# Patient Record
Sex: FEMALE | Race: BLACK OR AFRICAN AMERICAN | NOT HISPANIC OR LATINO | Employment: UNEMPLOYED | URBAN - METROPOLITAN AREA
[De-identification: names, ages, dates, MRNs, and addresses within clinical notes are randomized per-mention and may not be internally consistent; named-entity substitution may affect disease eponyms.]

---

## 2018-11-21 ENCOUNTER — TELEPHONE (OUTPATIENT)
Dept: HEMATOLOGY/ONCOLOGY | Facility: CLINIC | Age: 58
End: 2018-11-21

## 2018-11-21 ENCOUNTER — INITIAL CONSULT (OUTPATIENT)
Dept: GYNECOLOGIC ONCOLOGY | Facility: CLINIC | Age: 58
End: 2018-11-21
Payer: MEDICAID

## 2018-11-21 VITALS
SYSTOLIC BLOOD PRESSURE: 144 MMHG | HEIGHT: 65 IN | BODY MASS INDEX: 41.14 KG/M2 | DIASTOLIC BLOOD PRESSURE: 79 MMHG | HEART RATE: 78 BPM | WEIGHT: 246.94 LBS

## 2018-11-21 DIAGNOSIS — C54.1 ENDOMETRIAL CANCER: Primary | ICD-10-CM

## 2018-11-21 PROCEDURE — 99205 OFFICE O/P NEW HI 60 MIN: CPT | Mod: S$PBB,,, | Performed by: OBSTETRICS & GYNECOLOGY

## 2018-11-21 PROCEDURE — 99999 PR PBB SHADOW E&M-NEW PATIENT-LVL III: CPT | Mod: PBBFAC,,, | Performed by: OBSTETRICS & GYNECOLOGY

## 2018-11-21 PROCEDURE — 99203 OFFICE O/P NEW LOW 30 MIN: CPT | Mod: PBBFAC | Performed by: OBSTETRICS & GYNECOLOGY

## 2018-11-21 RX ORDER — METFORMIN HYDROCHLORIDE 1000 MG/1
1000 TABLET ORAL 2 TIMES DAILY WITH MEALS
COMMUNITY

## 2018-11-21 RX ORDER — HYDROCODONE BITARTRATE AND ACETAMINOPHEN 5; 325 MG/1; MG/1
1 TABLET ORAL EVERY 6 HOURS PRN
COMMUNITY
End: 2019-05-01

## 2018-11-21 RX ORDER — ROSUVASTATIN CALCIUM 20 MG/1
20 TABLET, COATED ORAL DAILY
COMMUNITY

## 2018-11-21 NOTE — LETTER
November 26, 2018      Gus Magaña Jr., MD  [address]  [facility] [address]           [place] [facility]  [address] [place] [id number]  Phone: [phone]  Fax: [phone]          Patient: Emerald Bee   MR Number: [id number]   YOB: 1960   Date of Visit: 11/21/2018       Dear Dr. Gus Magaña Jr.:    Thank you for referring Emerald Bee to me for evaluation. Attached you will find relevant portions of my assessment and plan of care.    If you have questions, please do not hesitate to call me. I look forward to following Emerald Bee along with you.    Sincerely,    Darwin Dumas MD    Enclosure  CC:  No Recipients    If you would like to receive this communication electronically, please contact [email or web address] or [phone] to request more information on [employer] Link access.    For providers and/or their staff who would like to refer a patient to [facility], please contact us through our one-stop-shop provider referral line, [facility], at [phone].    If you feel you have received this communication in error or would no longer like to receive these types of communications, please e-mail [email or web address]

## 2018-11-21 NOTE — TELEPHONE ENCOUNTER
----- Message from Foreign Burns MA sent at 11/21/2018 11:28 AM CST -----  Contact: Patient      ----- Message -----  From: Josiane Adorno  Sent: 11/21/2018  11:10 AM  To: [facility] Staff    Patient called and stated she received a message about coming in earlier today. She stated she can come for the 2 pm appointment. Please call her to confirm at [phone].    Thanks,  Josiane

## 2018-11-21 NOTE — PROGRESS NOTES
Subjective:      Patient ID: Emerald Bee is a 58 y.o. female.    Chief Complaint: Consult (endometrial)      HPI  Ms. Bee presents today for management of recently diagnosed endometrial cancer.  She reports a 6 month h/o PMB and underwent D&C.  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  CT scan revealed a 5 cm uterus and no obvious spread of disease.  She was noted to have a fat containing umbilical hernia.  TVUS on 9/23/18 showed a 10 x 5 cm uterus with a 4.5 cm right ovarian mass.  She reports minimal vaginal spotting since her D&C.  PSH significant for appy in 1979 via RLQ incision.  FH is negative for breast and gyn cancer.  She had a MU with colon cancer.  Review of Systems   Constitutional: Negative for activity change, appetite change, chills, fatigue and fever.   HENT: Negative for hearing loss, mouth sores, nosebleeds, sore throat and tinnitus.    Eyes: Negative for visual disturbance.   Respiratory: Negative for cough, chest tightness, shortness of breath and wheezing.    Cardiovascular: Negative for chest pain and leg swelling.   Gastrointestinal: Negative for abdominal distention, abdominal pain, blood in stool, constipation, diarrhea, nausea and vomiting.   Genitourinary: Negative for dysuria, flank pain, frequency, hematuria, pelvic pain, vaginal bleeding, vaginal discharge and vaginal pain.   Musculoskeletal: Negative for arthralgias and back pain.   Skin: Negative for rash.   Neurological: Negative for dizziness, seizures, syncope, weakness and numbness.   Hematological: Does not bruise/bleed easily.   Psychiatric/Behavioral: Negative for confusion and sleep disturbance. The patient is not nervous/anxious.        History reviewed. No pertinent past medical history.  History reviewed. No pertinent surgical history.  History reviewed. No pertinent family history.  Social History     Socioeconomic History    Marital status: Single     Spouse name: Not on file    Number of  children: Not on file    Years of education: Not on file    Highest education level: Not on file   Social Needs    Financial resource strain: Not on file    Food insecurity - worry: Not on file    Food insecurity - inability: Not on file    Transportation needs - medical: Not on file    Transportation needs - non-medical: Not on file   Occupational History    Not on file   Tobacco Use    Smoking status: Never Smoker    Smokeless tobacco: Never Used   Substance and Sexual Activity    Alcohol use: Yes    Drug use: Not on file    Sexual activity: Not on file   Other Topics Concern    Not on file   Social History Narrative    Not on file     Current Outpatient Medications   Medication Sig    HYDROcodone-acetaminophen (NORCO) 5-325 mg per tablet Take 1 tablet by mouth every 6 (six) hours as needed for Pain.    metFORMIN (GLUCOPHAGE) 1000 MG tablet Take 1,000 mg by mouth 2 (two) times daily with meals.    rosuvastatin (CRESTOR) 20 MG tablet Take 20 mg by mouth once daily.     No current facility-administered medications for this visit.      Review of patient's allergies indicates:  No Known Allergies    Objective:   Physical Exam:   Constitutional: She is oriented to person, place, and time. She appears well-developed and well-nourished. No distress.    HENT:   Head: Normocephalic and atraumatic.    Eyes: No scleral icterus.    Neck: Normal range of motion. Neck supple.    Cardiovascular: Normal rate and intact distal pulses.  Exam reveals no cyanosis and no edema.     Pulmonary/Chest: Effort normal. No respiratory distress. She exhibits no tenderness.        Abdominal: Soft. Normal appearance. She exhibits no distension, no fluid wave, no ascites and no mass. There is no tenderness. There is no rigidity, no rebound and no guarding. A hernia (umbilical) is present.         Genitourinary: Rectum normal, vagina normal and uterus normal. Pelvic exam was performed with patient supine. There is no rash,  tenderness or lesion on the right labia. There is no rash, tenderness or lesion on the left labia. Uterus is not deviated, not enlarged, not fixed, not tender, not hosting fibroids and not experiencing uterine prolapse. Cervix is normal. Right adnexum displays no mass, no tenderness and no fullness. Left adnexum displays no mass, no tenderness and no fullness. No bleeding in the vagina. No vaginal discharge found. Labial bartholins normal.Cervix exhibits no motion tenderness, no discharge and no friability.        Uterus Size: 11 cm   Musculoskeletal: Normal range of motion and moves all extremeties. She exhibits no edema.      Lymphadenopathy:     She has no cervical adenopathy.        Right: No inguinal adenopathy present.        Left: No inguinal adenopathy present.    Neurological: She is alert and oriented to person, place, and time.    Skin: Skin is warm. No rash noted. No cyanosis or erythema.    Psychiatric: She has a normal mood and affect. Thought content normal.       Assessment:     1. Endometrial cancer        Plan:       Spoke with the patient at length regarding her path and need for surgery.  Recommended RALH/BSO/Poss staging.  Will plan to do at [facility] in case she needs to be opened.  The risks, benefits, and indications of the procedure were discussed with the patient.  These included bleeding, infection, damage to surrounding tissues, conversion to laparotomy, and the possibility of major complications including death.  She voiced understanding, all questions were answered and consents were signed.

## 2018-11-26 ENCOUNTER — TELEPHONE (OUTPATIENT)
Dept: GYNECOLOGIC ONCOLOGY | Facility: CLINIC | Age: 58
End: 2018-11-26

## 2018-11-26 NOTE — TELEPHONE ENCOUNTER
----- Message from Venita Judahjimena sent at 11/26/2018  9:34 AM CST -----  Contact: DANIELA FAN [[id number]]            Name of Who is Calling: DANIELA FAN [[id number]]    What is the request in detail: Patient called she stated that she was waiting on a call back regarding her surgery being scheduled. Please call her.       Can the clinic reply by [facility]:no      What Number to Call Back if not in [facility]: [phone]

## 2018-11-27 ENCOUNTER — TELEPHONE (OUTPATIENT)
Dept: GYNECOLOGIC ONCOLOGY | Facility: CLINIC | Age: 58
End: 2018-11-27

## 2018-11-27 DIAGNOSIS — C54.1 ENDOMETRIAL CANCER: Primary | ICD-10-CM

## 2018-12-11 ENCOUNTER — TELEPHONE (OUTPATIENT)
Dept: GYNECOLOGIC ONCOLOGY | Facility: CLINIC | Age: 58
End: 2018-12-11

## 2018-12-20 ENCOUNTER — ANESTHESIA EVENT (OUTPATIENT)
Dept: SURGERY | Facility: OTHER | Age: 58
End: 2018-12-20
Payer: MEDICAID

## 2018-12-20 ENCOUNTER — HOSPITAL ENCOUNTER (OUTPATIENT)
Dept: PREADMISSION TESTING | Facility: OTHER | Age: 58
Discharge: HOME | End: 2018-12-20
Attending: OBSTETRICS & GYNECOLOGY
Payer: MEDICAID

## 2018-12-20 VITALS
OXYGEN SATURATION: 98 % | HEIGHT: 65 IN | DIASTOLIC BLOOD PRESSURE: 78 MMHG | HEART RATE: 73 BPM | TEMPERATURE: 98 F | BODY MASS INDEX: 38.75 KG/M2 | SYSTOLIC BLOOD PRESSURE: 137 MMHG | WEIGHT: 232.56 LBS

## 2018-12-20 LAB
ABO + RH BLD: NORMAL
BASOPHILS # BLD AUTO: 0.03 K/UL
BASOPHILS NFR BLD: 0.5 %
BLD GP AB SCN CELLS X3 SERPL QL: NORMAL
DIFFERENTIAL METHOD: NORMAL
EOSINOPHIL # BLD AUTO: 0.2 K/UL
EOSINOPHIL NFR BLD: 2.6 %
ERYTHROCYTE [DISTWIDTH] IN BLOOD BY AUTOMATED COUNT: 14.2 %
HCT VFR BLD AUTO: 37.4 %
HGB BLD-MCNC: 12.1 G/DL
LYMPHOCYTES # BLD AUTO: 2 K/UL
LYMPHOCYTES NFR BLD: 33.6 %
MCH RBC QN AUTO: 27.5 PG
MCHC RBC AUTO-ENTMCNC: 32.4 G/DL
MCV RBC AUTO: 85 FL
MONOCYTES # BLD AUTO: 0.4 K/UL
MONOCYTES NFR BLD: 6.6 %
NEUTROPHILS # BLD AUTO: 3.4 K/UL
NEUTROPHILS NFR BLD: 56.5 %
PLATELET # BLD AUTO: 251 K/UL
PMV BLD AUTO: 11.9 FL
RBC # BLD AUTO: 4.4 M/UL
WBC # BLD AUTO: 6.07 K/UL

## 2018-12-20 PROCEDURE — 36415 COLL VENOUS BLD VENIPUNCTURE: CPT

## 2018-12-20 PROCEDURE — 85025 COMPLETE CBC W/AUTO DIFF WBC: CPT

## 2018-12-20 PROCEDURE — 86850 RBC ANTIBODY SCREEN: CPT

## 2018-12-20 RX ORDER — FAMOTIDINE 20 MG/1
20 TABLET, FILM COATED ORAL
Status: CANCELLED | OUTPATIENT
Start: 2018-12-20 | End: 2018-12-20

## 2018-12-20 RX ORDER — LIDOCAINE HYDROCHLORIDE 10 MG/ML
0.5 INJECTION, SOLUTION EPIDURAL; INFILTRATION; INTRACAUDAL; PERINEURAL ONCE
Status: CANCELLED | OUTPATIENT
Start: 2018-12-20 | End: 2018-12-20

## 2018-12-20 RX ORDER — MIDAZOLAM HYDROCHLORIDE 5 MG/ML
5 INJECTION INTRAMUSCULAR; INTRAVENOUS ONCE AS NEEDED
Status: CANCELLED | OUTPATIENT
Start: 2018-12-20 | End: 2030-05-18

## 2018-12-20 RX ORDER — OXYCODONE HYDROCHLORIDE 5 MG/1
5 TABLET ORAL ONCE AS NEEDED
Status: CANCELLED | OUTPATIENT
Start: 2018-12-20 | End: 2030-05-18

## 2018-12-20 RX ORDER — SODIUM CHLORIDE, SODIUM LACTATE, POTASSIUM CHLORIDE, CALCIUM CHLORIDE 600; 310; 30; 20 MG/100ML; MG/100ML; MG/100ML; MG/100ML
INJECTION, SOLUTION INTRAVENOUS CONTINUOUS
Status: CANCELLED | OUTPATIENT
Start: 2018-12-20

## 2018-12-20 NOTE — ANESTHESIA PREPROCEDURE EVALUATION
12/20/2018  Emerald Bee is a 58 y.o., female.    Anesthesia Evaluation    I have reviewed the Patient Summary Reports.    I have reviewed the Nursing Notes.   I have reviewed the Medications.     Review of Systems  Anesthesia Hx:  No problems with previous Anesthesia    Social:  Smoker, Social Alcohol Use    Hematology/Oncology:  Hematology Normal      Current/Recent Cancer. Oncology Comments: Endometrial.    EENT/Dental:EENT/Dental Normal   Cardiovascular:  Cardiovascular Normal Exercise tolerance: good     Pulmonary:  Pulmonary Normal    Renal/:  Renal/ Normal     Hepatic/GI:  Hepatic/GI Normal    Musculoskeletal:  Musculoskeletal Normal    Neurological:  Neurology Normal    Endocrine:   Diabetes, well controlled, type 2    Dermatological:  Skin Normal    Psych:  Psychiatric Normal           Physical Exam  General:  Morbid Obesity    Airway/Jaw/Neck:  Airway Findings: Mouth Opening: Normal Tongue: Normal  Pre-Existing Airway Tube(s): Nasal Endotracheal  General Airway Assessment: Adult, Average  Mallampati: II  TM Distance: Normal, at least 6 cm  Jaw/Neck Findings:  Neck ROM: Normal ROM      Dental:  Dental Findings: In tact        Mental Status:  Mental Status Findings:  Cooperative, Alert and Oriented         Anesthesia Plan  Type of Anesthesia, risks & benefits discussed:  Anesthesia Type:  general  Patient's Preference:   Intra-op Monitoring Plan: standard ASA monitors  Intra-op Monitoring Plan Comments:   Post Op Pain Control Plan: per primary service following discharge from PACU  Post Op Pain Control Plan Comments:   Induction:    Beta Blocker:         Informed Consent: Patient understands risks and agrees with Anesthesia plan.  Questions answered.   ASA Score: 3     Day of Surgery Review of History & Physical:    H&P update referred to the surgeon.     Anesthesia Plan Notes: CBC and  T&S.        Ready For Surgery From Anesthesia Perspective.

## 2018-12-20 NOTE — DISCHARGE INSTRUCTIONS
PRE-ADMIT TESTING -  [phone]    [address]  [place] [facility]          Your surgery has been scheduled at [facility]. We are pleased to have the opportunity to serve you. For Further Information please call [phone].    On the day of surgery please report to the Information Desk on the 1st floor.    · CONTACT YOUR PHYSICIAN'S OFFICE THE DAY PRIOR TO YOUR SURGERY TO OBTAIN YOUR ARRIVAL TIME.     · The evening before surgery do not eat anything after 9 p.m. ( this includes hard candy, chewing gum and mints).  You may only have GATORADE, POWERADE AND WATER  from 9 p.m. until you leave your home.   DO NOT DRINK ANY LIQUIDS ON THE WAY TO THE HOSPITAL.      SPECIAL MEDICATION INSTRUCTIONS: TAKE medications checked off by the Anesthesiologist on your Medication List.    Angiogram Patients: Take medications as instructed by your physician, including aspirin.     Surgery Patients:    If you take ASPIRIN - Your PHYSICIAN/SURGEON will need to inform you IF/OR when you need to stop taking aspirin prior to your surgery.     Do Not take any medications containing IBUPROFEN.  Do Not Wear any make-up or dark nail polish   (especially eye make-up) to surgery. If you come to surgery with makeup on you will be required to remove the makeup or nail polish.    Do not shave your surgical area at least 5 days prior to your surgery. The surgical prep will be performed at the hospital according to Infection Control regulations.    Leave all valuables at home.   Do Not wear any jewelry or watches, including any metal in body piercings.  Contact Lens must be removed before surgery. Either do not wear the contact lens or bring a case and solution for storage.  Please bring a container for eyeglasses or dentures as required.  Bring any paperwork your physician has provided, such as consent forms,  history and physicals, doctor's orders, etc.   Bring comfortable clothes that are loose fitting to wear upon  discharge. Take into consideration the type of surgery being performed.  Maintain your diet as advised per your physician the day prior to surgery.      Adequate rest the night before surgery is advised.   Park in the Parking lot behind the hospital or in the [facility] across the street from the parking lot. Parking is complimentary.  If you will be discharged the same day as your procedure, please arrange for a responsible adult to drive you home or to accompany you if traveling by taxi.   YOU WILL NOT BE PERMITTED TO DRIVE OR TO LEAVE THE HOSPITAL ALONE AFTER SURGERY.   It is strongly recommended that you arrange for someone to remain with you for the first 24 hrs following your surgery.       Thank you for your cooperation.  The Staff of [facility].        Bathing Instructions                                                                 Please shower the evening before and morning of your procedure with    ANTIBACTERIAL SOAP. ( DIAL, etc )  Concentrate on the surgical area   for at least 3 minutes and rinse completely. Dry off as usual.   Do not use any deodorant, powder, body lotions, perfume, after shave or    cologne.

## 2018-12-26 ENCOUNTER — TELEPHONE (OUTPATIENT)
Dept: GYNECOLOGIC ONCOLOGY | Facility: CLINIC | Age: 58
End: 2018-12-26

## 2018-12-27 ENCOUNTER — HOSPITAL ENCOUNTER (OUTPATIENT)
Facility: OTHER | Age: 58
Discharge: HOME | End: 2018-12-28
Attending: OBSTETRICS & GYNECOLOGY | Admitting: OBSTETRICS & GYNECOLOGY
Payer: MEDICAID

## 2018-12-27 ENCOUNTER — ANESTHESIA (OUTPATIENT)
Dept: SURGERY | Facility: OTHER | Age: 58
End: 2018-12-27
Payer: MEDICAID

## 2018-12-27 DIAGNOSIS — Z90.710 S/P HYSTERECTOMY WITH OOPHORECTOMY: ICD-10-CM

## 2018-12-27 DIAGNOSIS — Z90.721 S/P HYSTERECTOMY WITH OOPHORECTOMY: ICD-10-CM

## 2018-12-27 DIAGNOSIS — C54.1 ENDOMETRIAL CANCER: Primary | ICD-10-CM

## 2018-12-27 LAB
POCT GLUCOSE: 114 MG/DL (ref 70–110)
POCT GLUCOSE: 120 MG/DL (ref 70–110)
POCT GLUCOSE: 154 MG/DL (ref 70–110)
POCT GLUCOSE: 182 MG/DL (ref 70–110)

## 2018-12-27 PROCEDURE — 63600175 PHARM REV CODE 636 W HCPCS: Performed by: OBSTETRICS & GYNECOLOGY

## 2018-12-27 PROCEDURE — 63600175 PHARM REV CODE 636 W HCPCS: Performed by: STUDENT IN AN ORGANIZED HEALTH CARE EDUCATION/TRAINING PROGRAM

## 2018-12-27 PROCEDURE — 38571 PR LAP,PELVIC LYMPHADENECTOMY: ICD-10-PCS | Mod: 51,AS,, | Performed by: NURSE PRACTITIONER

## 2018-12-27 PROCEDURE — 38571 LAPAROSCOPY LYMPHADENECTOMY: CPT | Mod: 51,AS,, | Performed by: NURSE PRACTITIONER

## 2018-12-27 PROCEDURE — 27100025 HC TUBING, SET FLUID WARMER: Performed by: NURSE ANESTHETIST, CERTIFIED REGISTERED

## 2018-12-27 PROCEDURE — 36000712 HC OR TIME LEV V 1ST 15 MIN: Performed by: OBSTETRICS & GYNECOLOGY

## 2018-12-27 PROCEDURE — 36000713 HC OR TIME LEV V EA ADD 15 MIN: Performed by: OBSTETRICS & GYNECOLOGY

## 2018-12-27 PROCEDURE — 88307 TISSUE EXAM BY PATHOLOGIST: CPT | Performed by: PATHOLOGY

## 2018-12-27 PROCEDURE — 00840 ANES IPER PX LOWER ABD NOS: CPT | Performed by: OBSTETRICS & GYNECOLOGY

## 2018-12-27 PROCEDURE — 25000003 PHARM REV CODE 250: Performed by: STUDENT IN AN ORGANIZED HEALTH CARE EDUCATION/TRAINING PROGRAM

## 2018-12-27 PROCEDURE — 38571 LAPAROSCOPY LYMPHADENECTOMY: CPT | Mod: 51,,, | Performed by: OBSTETRICS & GYNECOLOGY

## 2018-12-27 PROCEDURE — 88307 TISSUE SPECIMEN TO PATHOLOGY - SURGERY: ICD-10-PCS | Mod: 26,,, | Performed by: PATHOLOGY

## 2018-12-27 PROCEDURE — 71000033 HC RECOVERY, INTIAL HOUR: Performed by: OBSTETRICS & GYNECOLOGY

## 2018-12-27 PROCEDURE — 82962 GLUCOSE BLOOD TEST: CPT | Performed by: OBSTETRICS & GYNECOLOGY

## 2018-12-27 PROCEDURE — 63600175 PHARM REV CODE 636 W HCPCS: Performed by: SPECIALIST

## 2018-12-27 PROCEDURE — 25000003 PHARM REV CODE 250: Performed by: SPECIALIST

## 2018-12-27 PROCEDURE — 25000242 PHARM REV CODE 250 ALT 637 W/ HCPCS: Performed by: NURSE ANESTHETIST, CERTIFIED REGISTERED

## 2018-12-27 PROCEDURE — 25000003 PHARM REV CODE 250: Performed by: OBSTETRICS & GYNECOLOGY

## 2018-12-27 PROCEDURE — 38571 PR LAP,PELVIC LYMPHADENECTOMY: ICD-10-PCS | Mod: 51,,, | Performed by: OBSTETRICS & GYNECOLOGY

## 2018-12-27 PROCEDURE — 58571 TLH W/T/O 250 G OR LESS: CPT | Mod: ,,, | Performed by: OBSTETRICS & GYNECOLOGY

## 2018-12-27 PROCEDURE — 58571 PR LAPAROSCOPY W TOT HYSTERECTUTERUS <=250 GRAM  W TUBE/OVARY: ICD-10-PCS | Mod: ,,, | Performed by: OBSTETRICS & GYNECOLOGY

## 2018-12-27 PROCEDURE — 37000008 HC ANESTHESIA 1ST 15 MINUTES: Performed by: OBSTETRICS & GYNECOLOGY

## 2018-12-27 PROCEDURE — 88307 TISSUE EXAM BY PATHOLOGIST: CPT | Mod: 26,,, | Performed by: PATHOLOGY

## 2018-12-27 PROCEDURE — 27201423 OPTIME MED/SURG SUP & DEVICES STERILE SUPPLY: Performed by: OBSTETRICS & GYNECOLOGY

## 2018-12-27 PROCEDURE — 63600175 PHARM REV CODE 636 W HCPCS: Performed by: NURSE ANESTHETIST, CERTIFIED REGISTERED

## 2018-12-27 PROCEDURE — 58571 TLH W/T/O 250 G OR LESS: CPT | Mod: AS,,, | Performed by: NURSE PRACTITIONER

## 2018-12-27 PROCEDURE — 58571 PR LAPAROSCOPY W TOT HYSTERECTUTERUS <=250 GRAM  W TUBE/OVARY: ICD-10-PCS | Mod: AS,,, | Performed by: NURSE PRACTITIONER

## 2018-12-27 PROCEDURE — 88309 TISSUE EXAM BY PATHOLOGIST: CPT | Mod: 26,,, | Performed by: PATHOLOGY

## 2018-12-27 PROCEDURE — 71000039 HC RECOVERY, EACH ADD'L HOUR: Performed by: OBSTETRICS & GYNECOLOGY

## 2018-12-27 PROCEDURE — 94761 N-INVAS EAR/PLS OXIMETRY MLT: CPT

## 2018-12-27 PROCEDURE — 25000003 PHARM REV CODE 250: Performed by: NURSE ANESTHETIST, CERTIFIED REGISTERED

## 2018-12-27 PROCEDURE — 37000009 HC ANESTHESIA EA ADD 15 MINS: Performed by: OBSTETRICS & GYNECOLOGY

## 2018-12-27 PROCEDURE — 88309 TISSUE SPECIMEN TO PATHOLOGY - SURGERY: ICD-10-PCS | Mod: 26,,, | Performed by: PATHOLOGY

## 2018-12-27 RX ORDER — ONDANSETRON HYDROCHLORIDE 2 MG/ML
INJECTION, SOLUTION INTRAMUSCULAR; INTRAVENOUS
Status: DISCONTINUED | OUTPATIENT
Start: 2018-12-27 | End: 2018-12-27

## 2018-12-27 RX ORDER — ALBUTEROL SULFATE 90 UG/1
AEROSOL, METERED RESPIRATORY (INHALATION)
Status: DISCONTINUED | OUTPATIENT
Start: 2018-12-27 | End: 2018-12-27

## 2018-12-27 RX ORDER — CEFAZOLIN SODIUM 1 G/50ML
2 SOLUTION INTRAVENOUS
Status: COMPLETED | OUTPATIENT
Start: 2018-12-27 | End: 2018-12-27

## 2018-12-27 RX ORDER — DEXAMETHASONE SODIUM PHOSPHATE 4 MG/ML
INJECTION, SOLUTION INTRA-ARTICULAR; INTRALESIONAL; INTRAMUSCULAR; INTRAVENOUS; SOFT TISSUE
Status: DISCONTINUED | OUTPATIENT
Start: 2018-12-27 | End: 2018-12-27

## 2018-12-27 RX ORDER — FENTANYL CITRATE 50 UG/ML
25 INJECTION, SOLUTION INTRAMUSCULAR; INTRAVENOUS EVERY 5 MIN PRN
Status: DISCONTINUED | OUTPATIENT
Start: 2018-12-27 | End: 2018-12-27 | Stop reason: HOSPADM

## 2018-12-27 RX ORDER — SODIUM CHLORIDE, SODIUM LACTATE, POTASSIUM CHLORIDE, CALCIUM CHLORIDE 600; 310; 30; 20 MG/100ML; MG/100ML; MG/100ML; MG/100ML
INJECTION, SOLUTION INTRAVENOUS CONTINUOUS
Status: DISCONTINUED | OUTPATIENT
Start: 2018-12-27 | End: 2018-12-27

## 2018-12-27 RX ORDER — OXYCODONE HYDROCHLORIDE 5 MG/1
10 TABLET ORAL EVERY 6 HOURS PRN
Status: DISCONTINUED | OUTPATIENT
Start: 2018-12-27 | End: 2018-12-28 | Stop reason: HOSPADM

## 2018-12-27 RX ORDER — FAMOTIDINE 20 MG/1
20 TABLET, FILM COATED ORAL
Status: COMPLETED | OUTPATIENT
Start: 2018-12-27 | End: 2018-12-27

## 2018-12-27 RX ORDER — GLUCAGON 1 MG
1 KIT INJECTION
Status: DISCONTINUED | OUTPATIENT
Start: 2018-12-27 | End: 2018-12-28 | Stop reason: HOSPADM

## 2018-12-27 RX ORDER — LIDOCAINE HYDROCHLORIDE 10 MG/ML
0.5 INJECTION, SOLUTION EPIDURAL; INFILTRATION; INTRACAUDAL; PERINEURAL ONCE
Status: DISCONTINUED | OUTPATIENT
Start: 2018-12-27 | End: 2018-12-27 | Stop reason: HOSPADM

## 2018-12-27 RX ORDER — GLYCOPYRROLATE 0.2 MG/ML
INJECTION INTRAMUSCULAR; INTRAVENOUS
Status: DISCONTINUED | OUTPATIENT
Start: 2018-12-27 | End: 2018-12-27

## 2018-12-27 RX ORDER — FENTANYL CITRATE 50 UG/ML
INJECTION, SOLUTION INTRAMUSCULAR; INTRAVENOUS
Status: DISCONTINUED | OUTPATIENT
Start: 2018-12-27 | End: 2018-12-27

## 2018-12-27 RX ORDER — MUPIROCIN 20 MG/G
OINTMENT TOPICAL
Status: DISCONTINUED | OUTPATIENT
Start: 2018-12-27 | End: 2018-12-27 | Stop reason: HOSPADM

## 2018-12-27 RX ORDER — IBUPROFEN 200 MG
24 TABLET ORAL
Status: DISCONTINUED | OUTPATIENT
Start: 2018-12-27 | End: 2018-12-28 | Stop reason: HOSPADM

## 2018-12-27 RX ORDER — ROSUVASTATIN CALCIUM 10 MG/1
20 TABLET, COATED ORAL DAILY
Status: DISCONTINUED | OUTPATIENT
Start: 2018-12-27 | End: 2018-12-28 | Stop reason: HOSPADM

## 2018-12-27 RX ORDER — BISACODYL 10 MG
10 SUPPOSITORY, RECTAL RECTAL DAILY PRN
Status: DISCONTINUED | OUTPATIENT
Start: 2018-12-27 | End: 2018-12-28 | Stop reason: HOSPADM

## 2018-12-27 RX ORDER — IBUPROFEN 600 MG/1
600 TABLET ORAL EVERY 6 HOURS
Status: DISCONTINUED | OUTPATIENT
Start: 2018-12-28 | End: 2018-12-28 | Stop reason: HOSPADM

## 2018-12-27 RX ORDER — IBUPROFEN 200 MG
16 TABLET ORAL
Status: DISCONTINUED | OUTPATIENT
Start: 2018-12-27 | End: 2018-12-28 | Stop reason: HOSPADM

## 2018-12-27 RX ORDER — OXYCODONE HYDROCHLORIDE 5 MG/1
5 TABLET ORAL EVERY 6 HOURS PRN
Status: DISCONTINUED | OUTPATIENT
Start: 2018-12-27 | End: 2018-12-28 | Stop reason: HOSPADM

## 2018-12-27 RX ORDER — MEPERIDINE HYDROCHLORIDE 50 MG/ML
12.5 INJECTION INTRAMUSCULAR; INTRAVENOUS; SUBCUTANEOUS ONCE AS NEEDED
Status: DISCONTINUED | OUTPATIENT
Start: 2018-12-27 | End: 2018-12-27 | Stop reason: HOSPADM

## 2018-12-27 RX ORDER — LIDOCAINE HCL/PF 100 MG/5ML
SYRINGE (ML) INTRAVENOUS
Status: DISCONTINUED | OUTPATIENT
Start: 2018-12-27 | End: 2018-12-27

## 2018-12-27 RX ORDER — HYDROMORPHONE HYDROCHLORIDE 1 MG/ML
0.5 INJECTION, SOLUTION INTRAMUSCULAR; INTRAVENOUS; SUBCUTANEOUS EVERY 6 HOURS PRN
Status: DISCONTINUED | OUTPATIENT
Start: 2018-12-27 | End: 2018-12-28 | Stop reason: HOSPADM

## 2018-12-27 RX ORDER — MIDAZOLAM HYDROCHLORIDE 5 MG/ML
5 INJECTION INTRAMUSCULAR; INTRAVENOUS ONCE AS NEEDED
Status: COMPLETED | OUTPATIENT
Start: 2018-12-27 | End: 2018-12-27

## 2018-12-27 RX ORDER — INSULIN ASPART 100 [IU]/ML
0-5 INJECTION, SOLUTION INTRAVENOUS; SUBCUTANEOUS
Status: DISCONTINUED | OUTPATIENT
Start: 2018-12-27 | End: 2018-12-28 | Stop reason: HOSPADM

## 2018-12-27 RX ORDER — DIPHENHYDRAMINE HCL 25 MG
25 CAPSULE ORAL EVERY 4 HOURS PRN
Status: DISCONTINUED | OUTPATIENT
Start: 2018-12-27 | End: 2018-12-28 | Stop reason: HOSPADM

## 2018-12-27 RX ORDER — HYDROMORPHONE HYDROCHLORIDE 2 MG/ML
0.4 INJECTION, SOLUTION INTRAMUSCULAR; INTRAVENOUS; SUBCUTANEOUS EVERY 5 MIN PRN
Status: DISCONTINUED | OUTPATIENT
Start: 2018-12-27 | End: 2018-12-27 | Stop reason: HOSPADM

## 2018-12-27 RX ORDER — ACETAMINOPHEN 10 MG/ML
1000 INJECTION, SOLUTION INTRAVENOUS EVERY 8 HOURS
Status: COMPLETED | OUTPATIENT
Start: 2018-12-27 | End: 2018-12-28

## 2018-12-27 RX ORDER — DIPHENHYDRAMINE HYDROCHLORIDE 50 MG/ML
25 INJECTION INTRAMUSCULAR; INTRAVENOUS EVERY 4 HOURS PRN
Status: DISCONTINUED | OUTPATIENT
Start: 2018-12-27 | End: 2018-12-27

## 2018-12-27 RX ORDER — LIDOCAINE HYDROCHLORIDE 10 MG/ML
1 INJECTION, SOLUTION EPIDURAL; INFILTRATION; INTRACAUDAL; PERINEURAL ONCE
Status: DISCONTINUED | OUTPATIENT
Start: 2018-12-27 | End: 2018-12-27 | Stop reason: HOSPADM

## 2018-12-27 RX ORDER — KETOROLAC TROMETHAMINE 30 MG/ML
30 INJECTION, SOLUTION INTRAMUSCULAR; INTRAVENOUS EVERY 6 HOURS
Status: DISPENSED | OUTPATIENT
Start: 2018-12-27 | End: 2018-12-28

## 2018-12-27 RX ORDER — SODIUM CHLORIDE 0.9 % (FLUSH) 0.9 %
3 SYRINGE (ML) INJECTION
Status: DISCONTINUED | OUTPATIENT
Start: 2018-12-27 | End: 2018-12-28 | Stop reason: HOSPADM

## 2018-12-27 RX ORDER — DEXTROSE MONOHYDRATE, SODIUM CHLORIDE, AND POTASSIUM CHLORIDE 50; 1.49; 4.5 G/1000ML; G/1000ML; G/1000ML
INJECTION, SOLUTION INTRAVENOUS CONTINUOUS
Status: DISCONTINUED | OUTPATIENT
Start: 2018-12-27 | End: 2018-12-28

## 2018-12-27 RX ORDER — ROCURONIUM BROMIDE 10 MG/ML
INJECTION, SOLUTION INTRAVENOUS
Status: DISCONTINUED | OUTPATIENT
Start: 2018-12-27 | End: 2018-12-27

## 2018-12-27 RX ORDER — PROPOFOL 10 MG/ML
VIAL (ML) INTRAVENOUS
Status: DISCONTINUED | OUTPATIENT
Start: 2018-12-27 | End: 2018-12-27

## 2018-12-27 RX ORDER — ACETAMINOPHEN 10 MG/ML
INJECTION, SOLUTION INTRAVENOUS
Status: DISCONTINUED | OUTPATIENT
Start: 2018-12-27 | End: 2018-12-27

## 2018-12-27 RX ORDER — NEOSTIGMINE METHYLSULFATE 1 MG/ML
INJECTION, SOLUTION INTRAVENOUS
Status: DISCONTINUED | OUTPATIENT
Start: 2018-12-27 | End: 2018-12-27

## 2018-12-27 RX ORDER — ONDANSETRON 2 MG/ML
4 INJECTION INTRAMUSCULAR; INTRAVENOUS DAILY PRN
Status: DISCONTINUED | OUTPATIENT
Start: 2018-12-27 | End: 2018-12-27 | Stop reason: HOSPADM

## 2018-12-27 RX ORDER — MUPIROCIN 20 MG/G
1 OINTMENT TOPICAL 2 TIMES DAILY
Status: DISCONTINUED | OUTPATIENT
Start: 2018-12-27 | End: 2018-12-28 | Stop reason: HOSPADM

## 2018-12-27 RX ORDER — OXYCODONE HYDROCHLORIDE 5 MG/1
5 TABLET ORAL
Status: DISCONTINUED | OUTPATIENT
Start: 2018-12-27 | End: 2018-12-27 | Stop reason: HOSPADM

## 2018-12-27 RX ORDER — PHENYLEPHRINE HYDROCHLORIDE 10 MG/ML
INJECTION INTRAVENOUS
Status: DISCONTINUED | OUTPATIENT
Start: 2018-12-27 | End: 2018-12-27

## 2018-12-27 RX ORDER — ONDANSETRON 8 MG/1
8 TABLET, ORALLY DISINTEGRATING ORAL EVERY 8 HOURS PRN
Status: DISCONTINUED | OUTPATIENT
Start: 2018-12-27 | End: 2018-12-28 | Stop reason: HOSPADM

## 2018-12-27 RX ORDER — OXYCODONE HYDROCHLORIDE 5 MG/1
5 TABLET ORAL ONCE AS NEEDED
Status: DISCONTINUED | OUTPATIENT
Start: 2018-12-27 | End: 2018-12-27 | Stop reason: HOSPADM

## 2018-12-27 RX ADMIN — FENTANYL CITRATE 100 MCG: 50 INJECTION, SOLUTION INTRAMUSCULAR; INTRAVENOUS at 07:12

## 2018-12-27 RX ADMIN — PHENYLEPHRINE HYDROCHLORIDE 100 MCG: 10 INJECTION INTRAVENOUS at 08:12

## 2018-12-27 RX ADMIN — NEOSTIGMINE METHYLSULFATE 5 MG: 1 INJECTION INTRAVENOUS at 09:12

## 2018-12-27 RX ADMIN — SUGAMMADEX 200 MG: 100 INJECTION, SOLUTION INTRAVENOUS at 09:12

## 2018-12-27 RX ADMIN — ROCURONIUM BROMIDE 20 MG: 10 INJECTION INTRAVENOUS at 07:12

## 2018-12-27 RX ADMIN — FENTANYL CITRATE 50 MCG: 50 INJECTION, SOLUTION INTRAMUSCULAR; INTRAVENOUS at 07:12

## 2018-12-27 RX ADMIN — CARBOXYMETHYLCELLULOSE SODIUM 2 DROP: 2.5 SOLUTION/ DROPS OPHTHALMIC at 07:12

## 2018-12-27 RX ADMIN — SODIUM CHLORIDE, SODIUM LACTATE, POTASSIUM CHLORIDE, AND CALCIUM CHLORIDE: 600; 310; 30; 20 INJECTION, SOLUTION INTRAVENOUS at 08:12

## 2018-12-27 RX ADMIN — MUPIROCIN: 20 OINTMENT TOPICAL at 05:12

## 2018-12-27 RX ADMIN — PHENYLEPHRINE HYDROCHLORIDE 100 MCG: 10 INJECTION INTRAVENOUS at 07:12

## 2018-12-27 RX ADMIN — MUPIROCIN 1 G: 20 OINTMENT TOPICAL at 10:12

## 2018-12-27 RX ADMIN — DEXAMETHASONE SODIUM PHOSPHATE 4 MG: 4 INJECTION, SOLUTION INTRAMUSCULAR; INTRAVENOUS at 07:12

## 2018-12-27 RX ADMIN — ROCURONIUM BROMIDE 10 MG: 10 INJECTION INTRAVENOUS at 08:12

## 2018-12-27 RX ADMIN — ONDANSETRON 4 MG: 2 INJECTION, SOLUTION INTRAMUSCULAR; INTRAVENOUS at 09:12

## 2018-12-27 RX ADMIN — LIDOCAINE HYDROCHLORIDE 50 MG: 20 INJECTION, SOLUTION INTRAVENOUS at 07:12

## 2018-12-27 RX ADMIN — ACETAMINOPHEN 1000 MG: 10 INJECTION, SOLUTION INTRAVENOUS at 10:12

## 2018-12-27 RX ADMIN — ONDANSETRON 8 MG: 8 TABLET, ORALLY DISINTEGRATING ORAL at 01:12

## 2018-12-27 RX ADMIN — CEFAZOLIN SODIUM 2 G: 1 SOLUTION INTRAVENOUS at 07:12

## 2018-12-27 RX ADMIN — PROPOFOL 200 MG: 10 INJECTION, EMULSION INTRAVENOUS at 07:12

## 2018-12-27 RX ADMIN — ROCURONIUM BROMIDE 50 MG: 10 INJECTION INTRAVENOUS at 07:12

## 2018-12-27 RX ADMIN — FAMOTIDINE 20 MG: 20 TABLET, FILM COATED ORAL at 05:12

## 2018-12-27 RX ADMIN — KETOROLAC TROMETHAMINE 30 MG: 30 INJECTION, SOLUTION INTRAMUSCULAR at 11:12

## 2018-12-27 RX ADMIN — HYDROMORPHONE HYDROCHLORIDE 0.4 MG: 2 INJECTION INTRAMUSCULAR; INTRAVENOUS; SUBCUTANEOUS at 09:12

## 2018-12-27 RX ADMIN — DEXTROSE, SODIUM CHLORIDE, AND POTASSIUM CHLORIDE: 5; .45; .15 INJECTION INTRAVENOUS at 11:12

## 2018-12-27 RX ADMIN — GLYCOPYRROLATE 0.2 MG: 0.2 INJECTION, SOLUTION INTRAMUSCULAR; INTRAVENOUS at 07:12

## 2018-12-27 RX ADMIN — SODIUM CHLORIDE, SODIUM LACTATE, POTASSIUM CHLORIDE, AND CALCIUM CHLORIDE: 600; 310; 30; 20 INJECTION, SOLUTION INTRAVENOUS at 06:12

## 2018-12-27 RX ADMIN — ACETAMINOPHEN 1000 MG: 10 INJECTION, SOLUTION INTRAVENOUS at 05:12

## 2018-12-27 RX ADMIN — GLYCOPYRROLATE 0.8 MG: 0.2 INJECTION, SOLUTION INTRAMUSCULAR; INTRAVENOUS at 09:12

## 2018-12-27 RX ADMIN — ALBUTEROL SULFATE 2 PUFF: 90 AEROSOL, METERED RESPIRATORY (INHALATION) at 09:12

## 2018-12-27 RX ADMIN — MIDAZOLAM HYDROCHLORIDE 5 MG: 5 INJECTION, SOLUTION INTRAMUSCULAR; INTRAVENOUS at 05:12

## 2018-12-27 RX ADMIN — ACETAMINOPHEN 1000 MG: 10 INJECTION, SOLUTION INTRAVENOUS at 09:12

## 2018-12-27 RX ADMIN — KETOROLAC TROMETHAMINE 30 MG: 30 INJECTION, SOLUTION INTRAMUSCULAR at 05:12

## 2018-12-27 NOTE — ANESTHESIA POSTPROCEDURE EVALUATION
"Anesthesia Post Evaluation    Patient: Emerald Bee    Procedure(s) Performed: Procedure(s) (LRB):  XI ROBOTIC HYSTERECTOMY (N/A)  XI ROBOTIC SALPINGO-OOPHORECTOMY (Bilateral)  STAGING (N/A)    Final Anesthesia Type: general  Patient location during evaluation: PACU  Patient participation: Yes- Able to Participate  Level of consciousness: awake and alert  Post-procedure vital signs: reviewed and stable  Pain management: adequate  Airway patency: patent  PONV status at discharge: No PONV  Anesthetic complications: no      Cardiovascular status: blood pressure returned to baseline  Respiratory status: unassisted, spontaneous ventilation and room air  Hydration status: euvolemic  Follow-up not needed.        Visit Vitals  /68   Pulse 68   Temp 36.8 °C (98.2 °F)   Resp 16   Ht 5' 4.5" (1.638 m)   Wt 105.5 kg (232 lb 9.4 oz)   SpO2 (!) 94%   Breastfeeding? No   BMI 39.31 kg/m²       Pain/Anna Score: Pain Rating Prior to Med Admin: 7 (12/27/2018  9:56 AM)  Pain Rating Post Med Admin: 4 (12/27/2018 10:45 AM)  Anna Score: 9 (12/27/2018 10:45 AM)        "

## 2018-12-27 NOTE — CARE UPDATE
Received patient to room 367. Pt AAO x 4. Resp. Even and unlabored. Abd lap sites x 5 with steri strips CDI. Denies any c/o discomfort . SCD's in place bilateral. Oriented to room. F/C patent and draining clear yellow urine to g/u bag. VSS. Family @ bedside. O2@2L/NC. Tolerating well. Accepting po liquids well. Diet advanced as ordered. SAfety maintained. Bed in lowest position and locked. Call light in reach.

## 2018-12-27 NOTE — OPERATIVE NOTE ADDENDUM
Certification of Assistant at Surgery       Surgery Date: 12/27/2018     Participating Surgeons:  Surgeon(s) and Role:     * Darwin Dumas MD - Primary    Procedures:  Procedure(s) (LRB):  XI ROBOTIC HYSTERECTOMY (N/A)  XI ROBOTIC SALPINGO-OOPHORECTOMY (Bilateral)  STAGING (N/A)    Assistant Surgeon's Certification of Necessity:  I understand that section 1842 (b) (6) (d) of the Social Security Act generally prohibits Medicare Part B reasonable charge payment for the services of assistants at surgery in teaching hospitals when qualified residents are available to furnish such services. I certify that the services for which payment is claimed were medically necessary, and that no qualified resident was available to perform the services. I further understand that these services are subject to post-payment review by the Medicare carrier.      Gisselle Alexander NP    12/27/2018  9:30 AM

## 2018-12-27 NOTE — H&P
Patient evaluated, no changes from recent H and P which is copied below.    General: comfortable  Cardiopulm: CTAB, RRR  Abdomen: soft, hernia palpated, RLQ incision    Plan:  Proceed with RATLH/BSO/Pos staging  Patient understands risks of laparotomy      Prior H and P from 11/26:    Subjective:      Patient ID: Emerald Bee is a 58 y.o. female.     Chief Complaint: Consult (endometrial)        HPI  Ms. Bee presents today for management of recently diagnosed endometrial cancer.  She reports a 6 month h/o PMB and underwent D&C.  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  CT scan revealed a 5 cm uterus and no obvious spread of disease.  She was noted to have a fat containing umbilical hernia.  TVUS on 9/23/18 showed a 10 x 5 cm uterus with a 4.5 cm right ovarian mass.  She reports minimal vaginal spotting since her D&C.  PSH significant for appy in 1979 via RLQ incision.  FH is negative for breast and gyn cancer.  She had a MU with colon cancer.  Review of Systems   Constitutional: Negative for activity change, appetite change, chills, fatigue and fever.   HENT: Negative for hearing loss, mouth sores, nosebleeds, sore throat and tinnitus.    Eyes: Negative for visual disturbance.   Respiratory: Negative for cough, chest tightness, shortness of breath and wheezing.    Cardiovascular: Negative for chest pain and leg swelling.   Gastrointestinal: Negative for abdominal distention, abdominal pain, blood in stool, constipation, diarrhea, nausea and vomiting.   Genitourinary: Negative for dysuria, flank pain, frequency, hematuria, pelvic pain, vaginal bleeding, vaginal discharge and vaginal pain.   Musculoskeletal: Negative for arthralgias and back pain.   Skin: Negative for rash.   Neurological: Negative for dizziness, seizures, syncope, weakness and numbness.   Hematological: Does not bruise/bleed easily.   Psychiatric/Behavioral: Negative for confusion and sleep disturbance. The patient is not  nervous/anxious.          History reviewed. No pertinent past medical history.  History reviewed. No pertinent surgical history.  History reviewed. No pertinent family history.  Social History               Socioeconomic History    Marital status: Single       Spouse name: Not on file    Number of children: Not on file    Years of education: Not on file    Highest education level: Not on file   Social Needs    Financial resource strain: Not on file    Food insecurity - worry: Not on file    Food insecurity - inability: Not on file    Transportation needs - medical: Not on file    Transportation needs - non-medical: Not on file   Occupational History    Not on file   Tobacco Use    Smoking status: Never Smoker    Smokeless tobacco: Never Used   Substance and Sexual Activity    Alcohol use: Yes    Drug use: Not on file    Sexual activity: Not on file   Other Topics Concern    Not on file   Social History Narrative    Not on file              Current Outpatient Medications   Medication Sig    HYDROcodone-acetaminophen (NORCO) 5-325 mg per tablet Take 1 tablet by mouth every 6 (six) hours as needed for Pain.    metFORMIN (GLUCOPHAGE) 1000 MG tablet Take 1,000 mg by mouth 2 (two) times daily with meals.    rosuvastatin (CRESTOR) 20 MG tablet Take 20 mg by mouth once daily.      No current facility-administered medications for this visit.       Review of patient's allergies indicates:  No Known Allergies     Objective:   Physical Exam:   Constitutional: She is oriented to person, place, and time. She appears well-developed and well-nourished. No distress.    HENT:   Head: Normocephalic and atraumatic.    Eyes: No scleral icterus.    Neck: Normal range of motion. Neck supple.    Cardiovascular: Normal rate and intact distal pulses.  Exam reveals no cyanosis and no edema.     Pulmonary/Chest: Effort normal. No respiratory distress. She exhibits no tenderness.         Abdominal: Soft. Normal appearance.  She exhibits no distension, no fluid wave, no ascites and no mass. There is no tenderness. There is no rigidity, no rebound and no guarding. A hernia (umbilical) is present.         Genitourinary: Rectum normal, vagina normal and uterus normal. Pelvic exam was performed with patient supine. There is no rash, tenderness or lesion on the right labia. There is no rash, tenderness or lesion on the left labia. Uterus is not deviated, not enlarged, not fixed, not tender, not hosting fibroids and not experiencing uterine prolapse. Cervix is normal. Right adnexum displays no mass, no tenderness and no fullness. Left adnexum displays no mass, no tenderness and no fullness. No bleeding in the vagina. No vaginal discharge found. Labial bartholins normal.Cervix exhibits no motion tenderness, no discharge and no friability.        Uterus Size: 11 cm   Musculoskeletal: Normal range of motion and moves all extremeties. She exhibits no edema.      Lymphadenopathy:     She has no cervical adenopathy.        Right: No inguinal adenopathy present.        Left: No inguinal adenopathy present.    Neurological: She is alert and oriented to person, place, and time.    Skin: Skin is warm. No rash noted. No cyanosis or erythema.    Psychiatric: She has a normal mood and affect. Thought content normal.         Assessment:      1. Endometrial cancer          Plan:       Spoke with the patient at length regarding her path and need for surgery.  Recommended RALH/BSO/Poss staging.  Will plan to do at [facility] in case she needs to be opened.  The risks, benefits, and indications of the procedure were discussed with the patient.  These included bleeding, infection, damage to surrounding tissues, conversion to laparotomy, and the possibility of major complications including death.  She voiced understanding, all questions were answered and consents were signed.

## 2018-12-27 NOTE — TRANSFER OF CARE
"Anesthesia Transfer of Care Note    Patient: Emerald Bee    Procedure(s) Performed: Procedure(s) (LRB):  XI ROBOTIC HYSTERECTOMY (N/A)  XI ROBOTIC SALPINGO-OOPHORECTOMY (Bilateral)  STAGING (N/A)    Patient location: PACU    Anesthesia Type: general    Transport from OR: Transported from OR on 2-3 L/min O2 by NC with adequate spontaneous ventilation    Post pain: adequate analgesia    Post assessment: no apparent anesthetic complications    Post vital signs: stable    Level of consciousness: awake    Nausea/Vomiting: no nausea/vomiting    Complications: none    Transfer of care protocol was followed      Last vitals:   Visit Vitals  /75   Pulse 80   Temp 36.6 °C (97.8 °F)   Resp 18   Ht 5' 4.5" (1.638 m)   Wt 105.5 kg (232 lb 9.4 oz)   SpO2 95%   Breastfeeding? No   BMI 39.31 kg/m²     "

## 2018-12-27 NOTE — PLAN OF CARE
Initial Discharge Planning Assesment:  Patient admitted on 12/27/2018    Chart reviewed, Care plan discussed. Discussed care plan with treatment team,  attending Dr. Dumas    [facility] ([facility] Epic registry, unable to add as PCP)  Pharmacy, updated in [facility]: [facility] [place]    DME at home: [employer].    Current dispo Home with Family, pt lives with her adult daughter and her niece.   Transportation: Sister to drive home.    Case management  to follow.  Only request of CM is assistance completing disability forms, instructed patient forms will have to be completed with her primary care physician.  Information placed on AVS.    No further DC needs from CM perspective.       12/27/18 1408   Discharge Assessment   Assessment Type Discharge Planning Assessment   Confirmed/corrected address and phone number on facesheet? Yes   Assessment information obtained from? Patient   Communicated expected length of stay with patient/caregiver yes   Prior to hospitilization cognitive status: Alert/Oriented   Prior to hospitalization functional status: Independent   Current cognitive status: Alert/Oriented   Current Functional Status: Independent   Lives With child(diego), adult   Able to Return to Prior Arrangements yes   Is patient able to care for self after discharge? Yes   Who are your caregiver(s) and their phone number(s)? Sister: Haven Scruggs [phone]   Patient's perception of discharge disposition home or selfcare   Readmission Within the Last 30 Days no previous admission in last 30 days   Patient currently being followed by outpatient case management? No   Patient currently receives any other outside agency services? No   Equipment Currently Used at Home none   Do you have any problems affording any of your prescribed medications? No   Is the patient taking medications as prescribed? yes   Does the patient have transportation home? Yes   Transportation Anticipated family or friend will  provide   Does the patient receive services at the [facility]? No   Discharge Plan A Home with family   Discharge Plan B Home   Patient/Family in Agreement with Plan yes

## 2018-12-27 NOTE — PLAN OF CARE
Problem: Adult Inpatient Plan of Care  Goal: Plan of Care Review  Outcome: Ongoing (interventions implemented as appropriate)  Pt remains on RA. No resp distress noted.

## 2018-12-27 NOTE — BRIEF OP NOTE
[facility]  Brief Operative Note    SUMMARY     Surgery Date: 12/27/2018     Surgeon(s) and Role:     * Darwin Dumas MD - Primary    Assisting Surgeon: None    Pre-op Diagnosis:  Endometrial cancer [C54.1]    Post-op Diagnosis:  Post-Op Diagnosis Codes:     * Endometrial cancer [C54.1]    Procedure(s) (LRB):  XI ROBOTIC HYSTERECTOMY (N/A)  XI ROBOTIC SALPINGO-OOPHORECTOMY (Bilateral)  STAGING (N/A)    Anesthesia: General    Description of the findings of the procedure:   1. Mobile, 8 cm fibroid uterus  2. Normal appearing bilateral tubes and ovaries  3. Umbilical hernia without evidence of bowel or abdominal contents incarcerated  4. Normal upper abdomen  5. Uncomplicated pelvic LND    Estimated Blood Loss: 20cc         Specimens:   Specimen (12h ago, onward)    Start     Ordered    12/27/18 0847  Specimen to Pathology - Surgery  Once     Comments:  1.  Uterus cervix bilateral tubes and ovaries2.  Left pelvic lymph nodes3.  Right pelvic lymph nodes     Start Status   12/27/18 0847 Collected (12/27/18 0853)       12/27/18 0852

## 2018-12-28 VITALS
BODY MASS INDEX: 38.65 KG/M2 | OXYGEN SATURATION: 98 % | WEIGHT: 232 LBS | SYSTOLIC BLOOD PRESSURE: 116 MMHG | RESPIRATION RATE: 18 BRPM | HEIGHT: 65 IN | TEMPERATURE: 98 F | DIASTOLIC BLOOD PRESSURE: 62 MMHG | HEART RATE: 57 BPM

## 2018-12-28 LAB
BASOPHILS # BLD AUTO: 0.01 K/UL
BASOPHILS NFR BLD: 0.1 %
DIFFERENTIAL METHOD: ABNORMAL
EOSINOPHIL # BLD AUTO: 0 K/UL
EOSINOPHIL NFR BLD: 0.4 %
ERYTHROCYTE [DISTWIDTH] IN BLOOD BY AUTOMATED COUNT: 14.6 %
HCT VFR BLD AUTO: 34.3 %
HGB BLD-MCNC: 11 G/DL
LYMPHOCYTES # BLD AUTO: 1.6 K/UL
LYMPHOCYTES NFR BLD: 21.3 %
MCH RBC QN AUTO: 27.7 PG
MCHC RBC AUTO-ENTMCNC: 32.1 G/DL
MCV RBC AUTO: 86 FL
MONOCYTES # BLD AUTO: 0.5 K/UL
MONOCYTES NFR BLD: 7 %
NEUTROPHILS # BLD AUTO: 5.3 K/UL
NEUTROPHILS NFR BLD: 70.9 %
PLATELET # BLD AUTO: 225 K/UL
PMV BLD AUTO: 11.7 FL
POCT GLUCOSE: 126 MG/DL (ref 70–110)
RBC # BLD AUTO: 3.97 M/UL
WBC # BLD AUTO: 7.46 K/UL

## 2018-12-28 PROCEDURE — 63600175 PHARM REV CODE 636 W HCPCS: Performed by: STUDENT IN AN ORGANIZED HEALTH CARE EDUCATION/TRAINING PROGRAM

## 2018-12-28 PROCEDURE — 36415 COLL VENOUS BLD VENIPUNCTURE: CPT

## 2018-12-28 PROCEDURE — 25000003 PHARM REV CODE 250: Performed by: STUDENT IN AN ORGANIZED HEALTH CARE EDUCATION/TRAINING PROGRAM

## 2018-12-28 PROCEDURE — 85025 COMPLETE CBC W/AUTO DIFF WBC: CPT

## 2018-12-28 RX ORDER — OXYCODONE AND ACETAMINOPHEN 5; 325 MG/1; MG/1
1 TABLET ORAL EVERY 4 HOURS PRN
Qty: 20 TABLET | Refills: 0 | Status: SHIPPED | OUTPATIENT
Start: 2018-12-28 | End: 2019-05-01

## 2018-12-28 RX ORDER — IBUPROFEN 800 MG/1
800 TABLET ORAL 3 TIMES DAILY
Qty: 30 TABLET | Refills: 1 | Status: SHIPPED | OUTPATIENT
Start: 2018-12-28

## 2018-12-28 RX ADMIN — ACETAMINOPHEN 1000 MG: 10 INJECTION, SOLUTION INTRAVENOUS at 05:12

## 2018-12-28 RX ADMIN — OXYCODONE HYDROCHLORIDE 5 MG: 5 TABLET ORAL at 06:12

## 2018-12-28 RX ADMIN — MUPIROCIN 1 G: 20 OINTMENT TOPICAL at 09:12

## 2018-12-28 RX ADMIN — ROSUVASTATIN CALCIUM 20 MG: 10 TABLET, FILM COATED ORAL at 09:12

## 2018-12-28 RX ADMIN — KETOROLAC TROMETHAMINE 30 MG: 30 INJECTION, SOLUTION INTRAMUSCULAR at 05:12

## 2018-12-28 RX ADMIN — OXYCODONE HYDROCHLORIDE 10 MG: 5 TABLET ORAL at 12:12

## 2018-12-28 RX ADMIN — DEXTROSE, SODIUM CHLORIDE, AND POTASSIUM CHLORIDE: 5; .45; .15 INJECTION INTRAVENOUS at 12:12

## 2018-12-28 RX ADMIN — IBUPROFEN 600 MG: 600 TABLET ORAL at 11:12

## 2018-12-28 NOTE — DISCHARGE SUMMARY
[facility]  Obstetrics & Gynecology  Discharge Summary    Patient Name: Emerald Bee  MRN: [id number]  Admission Date: 12/27/2018  Hospital Length of Stay: 0 days  Discharge Date and Time:  12/28/2018 6:32 AM  Attending Physician: Darwin Dumas MD   Discharging Provider: Kareem Weir MD  Primary Care Provider: Primary Doctor No    HPI: Admitted for scheduled RALH/BSO/PND    Hospital Course: Meeting post op milestones on pod 1    Procedure(s) (LRB):  XI ROBOTIC HYSTERECTOMY (N/A)  XI ROBOTIC SALPINGO-OOPHORECTOMY (Bilateral)  STAGING (N/A)     Consults:     Significant Diagnostic Studies: Labs: All labs within the past 24 hours have been reviewed    Pending Diagnostic Studies:     None        Final Active Diagnoses:    Diagnosis Date Noted POA    PRINCIPAL PROBLEM:  Endometrial cancer [C54.1] 11/21/2018 Yes    S/P RA hysterectomy with oophorectomy, pelvid node dissection [Z90.710, Z90.721] 12/27/2018 Yes      Problems Resolved During this Admission:        Discharged Condition: good    Disposition: Home or Self Care    Follow Up:  Follow-up Information     Disability Application.    Contact information:  [email or web address]           Darwin Dumas MD In 6 weeks.    Specialties:  Gynecologic Oncology, Gynecology, Oncology  Why:  Post Op Visit  Contact information:  [address] [place] [address] [id number] [phone]                 Patient Instructions:      Other restrictions (specify):   Order Comments: No driving until pain free and off of pain meds  Nothing in vagina until cleared by gynecologist (no tampons, douching, sex)  No baths for two weeks, only showers  No lifting anything more than 10 pounds for two weeks     Notify your health care provider if you experience any of the following:  temperature >100.4     Notify your health care provider if you experience any of the following:  persistent nausea and vomiting or diarrhea     Notify your health care provider if  you experience any of the following:  severe uncontrolled pain     Notify your health care provider if you experience any of the following:  redness, tenderness, or signs of infection (pain, swelling, redness, odor or green/yellow discharge around incision site)     Medications:  Reconciled Home Medications:      Medication List      START taking these medications    ibuprofen 800 MG tablet  Commonly known as:  ADVIL,MOTRIN  Take 1 tablet (800 mg total) by mouth 3 (three) times daily.     oxyCODONE-acetaminophen 5-325 mg per tablet  Commonly known as:  PERCOCET  Take 1 tablet by mouth every 4 (four) hours as needed for Pain.        CONTINUE taking these medications    HYDROcodone-acetaminophen 5-325 mg per tablet  Commonly known as:  NORCO  Take 1 tablet by mouth every 6 (six) hours as needed for Pain.     metFORMIN 1000 MG tablet  Commonly known as:  GLUCOPHAGE  Take 1,000 mg by mouth 2 (two) times daily with meals.     rosuvastatin 20 MG tablet  Commonly known as:  CRESTOR  Take 20 mg by mouth once daily.            Kareem Weir MD  Obstetrics & Gynecology  [facility]

## 2018-12-28 NOTE — PLAN OF CARE
Family to drive home.    No DC needs from CM perspective.       12/28/18 0953   Final Note   Assessment Type Final Discharge Note   Anticipated Discharge Disposition Home   What phone number can be called within the next 1-3 days to see how you are doing after discharge? [phone]   Hospital Follow Up  Appt(s) scheduled? Yes   Discharge plans and expectations educations in teach back method with documentation complete? Yes   Right Care Referral Info   Post Acute Recommendation No Care

## 2018-12-28 NOTE — PLAN OF CARE
Problem: Adult Inpatient Plan of Care  Goal: Plan of Care Review  Outcome: Ongoing (interventions implemented as appropriate)  Patient on 2L NC with Sats 98%. No distress noted, will continue to monitor.

## 2018-12-28 NOTE — PROGRESS NOTES
Progress Note  Gynecology    Admit Date: 12/27/2018  LOS: 0    Reason for Admission:  Endometrial cancer    SUBJECTIVE:     Emerald Bee is a 58 y.o. presented for scheduled RALH/BSO/staging. Patient meeting post op milestones. Pain controlled. Dillon removed, has not voided.       OBJECTIVE:     Vital Signs   Temp:  [97.7 °F (36.5 °C)-98.6 °F (37 °C)] 98.1 °F (36.7 °C)  Pulse:  [66-98] 66  Resp:  [15-18] 17  SpO2:  [93 %-98 %] 97 %  BP: (101-139)/(61-71) 139/67      Intake/Output Summary (Last 24 hours) at 12/28/2018 0630  Last data filed at 12/28/2018 0000  Gross per 24 hour   Intake 2360 ml   Output 1620 ml   Net 740 ml       Physical Exam:  Gen: A&Ox3, NAD  CV: RRR  Pulm: LCTAB  Abd: active bowel sounds, soft, non-distended, non-tender to palpation without rebound or guarding  Inc: trocar sites c/d/i  Ext: PPP, no peripheral edema, TEDs/SCDs in place    Laboratory:  Recent Results (from the past 24 hour(s))   POCT glucose    Collection Time: 12/27/18 12:58 PM   Result Value Ref Range    POCT Glucose 182 (H) 70 - 110 mg/dL   POCT glucose    Collection Time: 12/27/18  4:52 PM   Result Value Ref Range    POCT Glucose 154 (H) 70 - 110 mg/dL   POCT glucose    Collection Time: 12/27/18 10:03 PM   Result Value Ref Range    POCT Glucose 114 (H) 70 - 110 mg/dL   CBC auto differential    Collection Time: 12/28/18  4:39 AM   Result Value Ref Range    WBC 7.46 3.90 - 12.70 K/uL    RBC 3.97 (L) 4.00 - 5.40 M/uL    Hemoglobin 11.0 (L) 12.0 - 16.0 g/dL    Hematocrit 34.3 (L) 37.0 - 48.5 %    MCV 86 82 - 98 fL    MCH 27.7 27.0 - 31.0 pg    MCHC 32.1 32.0 - 36.0 g/dL    RDW 14.6 (H) 11.5 - 14.5 %    Platelets 225 150 - 350 K/uL    MPV 11.7 9.2 - 12.9 fL    Gran # (ANC) 5.3 1.8 - 7.7 K/uL    Lymph # 1.6 1.0 - 4.8 K/uL    Mono # 0.5 0.3 - 1.0 K/uL    Eos # 0.0 0.0 - 0.5 K/uL    Baso # 0.01 0.00 - 0.20 K/uL    Gran% 70.9 38.0 - 73.0 %    Lymph% 21.3 18.0 - 48.0 %    Mono% 7.0 4.0 - 15.0 %    Eosinophil% 0.4 0.0 - 8.0 %     Basophil% 0.1 0.0 - 1.9 %    Differential Method Automated        Diagnostic Results:  Labs reviewed    ASSESSMENT/PLAN:     Active Hospital Problems    Diagnosis  POA    *Endometrial cancer [C54.1]  Yes    S/P RA hysterectomy with oophorectomy, pelvid node dissection [Z90.710, Z90.721]  Yes      Resolved Hospital Problems   No resolved problems to display.       Assessment: 58 y.o. POD 1 s/p RALH/BSO/PND    Plan:     - Routine post-op advances  - Oral analgesics  - D/C powell, begin passive voiding trial  - Encourage ambulation in am  - Encourage IS  - CBC wnl      Dispo: As patient meets appropriate post-op milestones, plan for discharge to home.        Kareem Weir MD  OB/GYN PGY-3  Pager: [phone]

## 2018-12-28 NOTE — PLAN OF CARE
Problem: Adult Inpatient Plan of Care  Goal: Plan of Care Review  Outcome: Ongoing (interventions implemented as appropriate)  VSS and afebrile, aaox4. Lap sites x5 CDI with steristrips. No complaints of pain. Bowel sounds normoactive. Dillon to gravity draining clear yellow urine. peripad in place with scant bloody drainage. Able to make needs known. ACHS and diabetic diet tolerated well. Plan of care reviewed with patient. Purposeful rounding done. Call light at bedside, bed at lowest position, brakes on, non skid socks on. Will continue to monitor.

## 2018-12-31 NOTE — OP NOTE
DATE OF PROCEDURE:  12/27/2018.    SURGEON:  Darwin Dumas M.D.    ASSISTANT:  Gisselle Alexander, nurse practitioner served as first assistant, Kareem Weir M.D. (RES).    PREOPERATIVE DIAGNOSIS:  Endometrial carcinoma.    POSTOPERATIVE DIAGNOSIS:  Endometrial carcinoma.    PROCEDURES PERFORMED:  Robotic-assisted laparoscopic hysterectomy, bilateral   salpingo-oophorectomy and lymphadenectomy.    COMPLICATIONS:  None.    ESTIMATED BLOOD LOSS:  Less than 50 mL    ANESTHESIA:  General.    INTRAOPERATIVE FINDINGS:  Included known umbilical hernia as well as no obvious   spread of disease.  The patient had normal appearing upper abdominal and pelvic   anatomy.    PROCEDURE IN DETAIL:  After informed consent was obtained, the patient was taken   to the Operating Suite.  General anesthesia was administered.  Once this was   felt to be adequate, she was placed in dorsal lithotomy position with her arms   tucked.  The abdomen and pelvis were prepped and draped in the usual sterile   fashion and a speculum was placed in the vagina.  The cervix was visualized,   grasped with single-tooth tenaculum.  The uterus sounded to approximately 10 cm.    Serial dilation of cervix was performed and a large VCare manipulator was   placed without difficulty.  Dillon catheter was placed to gravity drainage and   attention was turned to the abdominal portion of the procedure.  Due to the   presence of the umbilical hernia, I elected to perform a left upper quadrant   entry.  Gilberto was made at Morales's point 2 cm below the costal margin in the   midclavicular line and a Veress needle was placed through this after making skin   incision with scalpel.  Opening pressure was noted to be 0.  Pneumoperitoneum   was obtained with carbon dioxide and once this was felt to be adequate, an 8 mm   robotic trocar was placed through this.  Intraperitoneal placement was confirmed   with the camera and robotic trocars were placed through 8 mm incision in the    midline and then the left and right lower quadrant areas.  The AirSeal accessory   port was placed in the right upper quadrant.  The patient was placed in steep   Trendelenburg.  The robot was docked and instruments were passed in the   operative field.  The above stated findings were noted.  Adhesions of the   sigmoid colon and left pelvic sidewall were managed with monopolar cautery.  The   left round ligament was transected after sacrificing with bipolar cautery and   the anterior portion of the leaflets of the broad ligament were opened.  The   pararectal space was developed as was the paravesical space.  Once the ureter   was identified, a window was made beneath the infundibulopelvic ligament and   sacrificed with bipolar cautery and transected.  The medial fold of the   peritoneum was taken to the uterosacrals.  Attention was turned to the right   side where an identical dissection was performed.  Anteriorly, the vesicouterine   peritoneum was incised and the bladder was mobilized inferiorly below the ring.    A 10 o'clock to 2 o'clock colpotomy was performed.  Posteriorly, a 4 o'clock   to 8 o'clock colpotomy was performed and bilateral cardinal ligaments and   uterine vessels skeletonized their peritoneal attachments sacrificed with   bipolar cautery and transected.  Circumferential colpotomy was completed and the   uterus, cervix, bilateral tubes and ovaries were removed.  The cuff was made   hemostatic and attention was turned to the pelvic lymphadenectomy.  A complete   pelvic lymphadenectomy was performed from the bifurcation of the common iliac   vessels to the circumflex iliac vein.  This included the obturator teresa packets   bilaterally.  This was performed after identification of the obturator nerve.    There were some enlarged lymph nodes that were removed in their entirety.  The   dissection beds were inspected and noted to be hemostatic.  The cuff was then   closed in running nonlocking 0  Vicryl suture tied in the midline.  The pelvis   was irrigated and once hemostasis was confirmed, the instruments were removed,   the robot was undocked and pneumoperitoneum was evacuated.  The patient was   flattened and the trocars were removed.  Port sites were inspected and made   hemostatic with electrocautery and were closed with subcuticular 4-0 Monocryl   suture.  Steri-Strips were applied and the patient was awoken and taken to   Recovery Room in stable condition.  Of note, I was present for and performed all   key aspects of procedure.  Tiera Alexander's expertise was needed as there was   no qualified resident available.      MEGAN  dd: 12/31/2018 07:37:01 (CST)  td: 12/31/2018 08:04:28 (CST)  Doc ID   #[id number]  Job ID #[id number]    CC:

## 2019-01-03 ENCOUNTER — TELEPHONE (OUTPATIENT)
Dept: GYNECOLOGIC ONCOLOGY | Facility: CLINIC | Age: 59
End: 2019-01-03

## 2019-01-03 NOTE — TELEPHONE ENCOUNTER
----- Message from Stacie Del Cid sent at 1/3/2019  4:20 PM CST -----  Contact: Pt   Name of Who is Calling: DANIELA FAN [[phone]]     What is the request in detail: Pt is returning Foreign's call in regards to her procedure. Please advise.    Can the clinic reply by [facility]: No     What [facility]: [phone]

## 2019-01-03 NOTE — TELEPHONE ENCOUNTER
Regarding: RE:Reminder for Upcoming Procedure  Contact: [phone]  ----- Message from [facility], System Message sent at 1/3/2019  1:56 PM CST -----    I have been experiencing extremely painful bowel movement. At first I couldn't go for almost a week. I took a laxative and it moved. At first like water and now soft but the pain on a scale from 1 - 10 is a 20. I stopped the oxycodone because I threw up every time I took it. I don't have any more of the hydrocodone but the one pain I have is trying to have a bowel movement. I know some of it is gas but the actual passing is also painful what am I doing wrong. Help.  ----- Message -----  From: Darwin Dumas MD  Sent: 12/20/2018  8:30 AM CST  To: Emerald Bee  Subject: Reminder for Upcoming Procedure  [facility] [address] [place] [address]    12/20/2018      Dear your,      This is a reminder for your upcoming procedure with Darwin Dumas MD on 12/27/2018. We will contact you again before the day of your procedure with your scheduled arrival time.       If you have questions or scheduling concerns, you can contact your physicians office:   Darwin Dumas MD  Phone Number: [phone]      Sincerely,     [facility] [address] [place] [address]

## 2019-01-03 NOTE — TELEPHONE ENCOUNTER
01/03/19 rec'd pc form pt with c/o heavy cramps and severe pain 1-10 on scale pain is a 20. Pt states her stomach is distended and it hurts. Advised pt to go to Er for tx. [facility]/MA

## 2019-01-10 ENCOUNTER — TELEPHONE (OUTPATIENT)
Dept: GYNECOLOGIC ONCOLOGY | Facility: CLINIC | Age: 59
End: 2019-01-10

## 2019-01-10 NOTE — TELEPHONE ENCOUNTER
Called and talked to her concerning her path.  She has a IAG2, no invasion and no other risk factors.  Will not require further treatment.  She is doing well.  All questions answered.

## 2019-01-31 ENCOUNTER — TELEPHONE (OUTPATIENT)
Dept: GYNECOLOGIC ONCOLOGY | Facility: CLINIC | Age: 59
End: 2019-01-31

## 2019-02-01 ENCOUNTER — OFFICE VISIT (OUTPATIENT)
Dept: GYNECOLOGIC ONCOLOGY | Facility: CLINIC | Age: 59
End: 2019-02-01
Payer: MEDICAID

## 2019-02-01 VITALS
BODY MASS INDEX: 40.11 KG/M2 | DIASTOLIC BLOOD PRESSURE: 93 MMHG | HEART RATE: 65 BPM | HEIGHT: 65 IN | SYSTOLIC BLOOD PRESSURE: 147 MMHG | WEIGHT: 240.75 LBS

## 2019-02-01 DIAGNOSIS — Z90.710 S/P HYSTERECTOMY WITH OOPHORECTOMY: ICD-10-CM

## 2019-02-01 DIAGNOSIS — C54.1 ENDOMETRIAL CANCER: Primary | ICD-10-CM

## 2019-02-01 DIAGNOSIS — Z90.721 S/P HYSTERECTOMY WITH OOPHORECTOMY: ICD-10-CM

## 2019-02-01 PROCEDURE — 99024 POSTOP FOLLOW-UP VISIT: CPT | Mod: ,,, | Performed by: OBSTETRICS & GYNECOLOGY

## 2019-02-01 PROCEDURE — 99213 OFFICE O/P EST LOW 20 MIN: CPT | Mod: PBBFAC | Performed by: OBSTETRICS & GYNECOLOGY

## 2019-02-01 PROCEDURE — 99024 PR POST-OP FOLLOW-UP VISIT: ICD-10-PCS | Mod: ,,, | Performed by: OBSTETRICS & GYNECOLOGY

## 2019-02-01 PROCEDURE — 99999 PR PBB SHADOW E&M-EST. PATIENT-LVL III: CPT | Mod: PBBFAC,,, | Performed by: OBSTETRICS & GYNECOLOGY

## 2019-02-01 PROCEDURE — 99999 PR PBB SHADOW E&M-EST. PATIENT-LVL III: ICD-10-PCS | Mod: PBBFAC,,, | Performed by: OBSTETRICS & GYNECOLOGY

## 2019-02-01 NOTE — PROGRESS NOTES
Subjective:      Patient ID: Emerald Bee is a 58 y.o. female.    Chief Complaint: Post-op Evaluation (4week)    Treatment History  Stage IAG2 endometrial cancer  RALH/BSO/Pelvic LAD on 12/27/18    HPI  Here today for postop check and path discussion.  Has done well since surgery.  Denies VB, F/C, N/V.  Reports normal bladder and bowel function.  Review of Systems   Constitutional: Negative for activity change, appetite change, chills, fatigue and fever.   HENT: Negative for hearing loss, mouth sores, nosebleeds, sore throat and tinnitus.    Eyes: Negative for visual disturbance.   Respiratory: Negative for cough, chest tightness, shortness of breath and wheezing.    Cardiovascular: Negative for chest pain and leg swelling.   Gastrointestinal: Negative for abdominal distention, abdominal pain, blood in stool, constipation, diarrhea, nausea and vomiting.   Genitourinary: Negative for dysuria, flank pain, frequency, hematuria, pelvic pain, vaginal bleeding, vaginal discharge and vaginal pain.   Musculoskeletal: Negative for arthralgias and back pain.   Skin: Negative for rash.   Neurological: Negative for dizziness, seizures, syncope, weakness and numbness.   Hematological: Does not bruise/bleed easily.   Psychiatric/Behavioral: Negative for confusion and sleep disturbance. The patient is not nervous/anxious.        Objective:   Physical Exam:   Constitutional: She appears well-developed and well-nourished. No distress.    HENT:   Head: Normocephalic and atraumatic.    Eyes: No scleral icterus.    Neck: Normal range of motion. Neck supple.    Cardiovascular: Normal rate and intact distal pulses.  Exam reveals no cyanosis and no edema.     Pulmonary/Chest: Effort normal. No respiratory distress. She exhibits no tenderness.        Abdominal: Soft. She exhibits no distension (incisions healing well), no fluid wave, no ascites and no mass. There is no tenderness. There is no rebound and no guarding. No hernia.      Genitourinary: Rectum normal and vagina normal. Pelvic exam was performed with patient supine. There is no rash, tenderness or lesion on the right labia. There is no rash, tenderness or lesion on the left labia. Uterus is absent. There is an absent adnexa. Right adnexum displays no mass, no tenderness and no fullness. Left adnexum displays no mass, no tenderness and no fullness. No bleeding (cuff heling well) or unspecified prolapse of vaginal walls in the vagina. No vaginal discharge found. Vaginal cuff normal.Labial bartholins normal.Cervix exhibits absence.              Lymphadenopathy:     She has no cervical adenopathy.        Right: No inguinal adenopathy present.        Left: No inguinal adenopathy present.     Skin: No cyanosis.        Assessment:     1. Endometrial cancer    2. S/P RA hysterectomy with oophorectomy, pelvid node dissection        Plan:       Doing well postop.  Path discussed with her.  Will not need further treatment.  RTC 3 months for obs.

## 2019-02-04 ENCOUNTER — TELEPHONE (OUTPATIENT)
Dept: GYNECOLOGIC ONCOLOGY | Facility: CLINIC | Age: 59
End: 2019-02-04

## 2019-02-04 DIAGNOSIS — Z90.721 S/P HYSTERECTOMY WITH OOPHORECTOMY: ICD-10-CM

## 2019-02-04 DIAGNOSIS — Z90.710 S/P HYSTERECTOMY WITH OOPHORECTOMY: ICD-10-CM

## 2019-02-04 NOTE — TELEPHONE ENCOUNTER
Attempted to contact pt regarding which pharmacy she wanted pain medication to be called into. Left pt vm to call office.

## 2019-05-01 ENCOUNTER — OFFICE VISIT (OUTPATIENT)
Dept: GYNECOLOGIC ONCOLOGY | Facility: CLINIC | Age: 59
End: 2019-05-01
Payer: MEDICAID

## 2019-05-01 VITALS
SYSTOLIC BLOOD PRESSURE: 127 MMHG | HEART RATE: 67 BPM | WEIGHT: 245.81 LBS | DIASTOLIC BLOOD PRESSURE: 67 MMHG | HEIGHT: 65 IN | BODY MASS INDEX: 40.96 KG/M2

## 2019-05-01 DIAGNOSIS — C54.1 ENDOMETRIAL CANCER: Primary | ICD-10-CM

## 2019-05-01 PROCEDURE — 99999 PR PBB SHADOW E&M-EST. PATIENT-LVL III: CPT | Mod: PBBFAC,,, | Performed by: OBSTETRICS & GYNECOLOGY

## 2019-05-01 PROCEDURE — 99999 PR PBB SHADOW E&M-EST. PATIENT-LVL III: ICD-10-PCS | Mod: PBBFAC,,, | Performed by: OBSTETRICS & GYNECOLOGY

## 2019-05-01 PROCEDURE — 99214 OFFICE O/P EST MOD 30 MIN: CPT | Mod: S$PBB,,, | Performed by: OBSTETRICS & GYNECOLOGY

## 2019-05-01 PROCEDURE — 99213 OFFICE O/P EST LOW 20 MIN: CPT | Mod: PBBFAC | Performed by: OBSTETRICS & GYNECOLOGY

## 2019-05-01 PROCEDURE — 99214 PR OFFICE/OUTPT VISIT, EST, LEVL IV, 30-39 MIN: ICD-10-PCS | Mod: S$PBB,,, | Performed by: OBSTETRICS & GYNECOLOGY

## 2019-05-01 NOTE — PROGRESS NOTES
Subjective:      Patient ID: Emerald Bee is a 58 y.o. female.    Chief Complaint: Endometrial Cancer    Treatment History  Stage IAG2 endometrial cancer  RALH/BSO/Pelvic LAD on 12/27/18    HPI  Here today for continued f/u. Denies VB, F/C, N/V.  Reports normal bladder and bowel function.  Has no new complaints and reports feeling well.  Review of Systems   Constitutional: Negative for activity change, appetite change, chills, fatigue and fever.   HENT: Negative for hearing loss, mouth sores, nosebleeds, sore throat and tinnitus.    Eyes: Negative for visual disturbance.   Respiratory: Negative for cough, chest tightness, shortness of breath and wheezing.    Cardiovascular: Negative for chest pain and leg swelling.   Gastrointestinal: Negative for abdominal distention, abdominal pain, blood in stool, constipation, diarrhea, nausea and vomiting.   Genitourinary: Negative for dysuria, flank pain, frequency, hematuria, pelvic pain, vaginal bleeding, vaginal discharge and vaginal pain.   Musculoskeletal: Negative for arthralgias and back pain.   Skin: Negative for rash.   Neurological: Negative for dizziness, seizures, syncope, weakness and numbness.   Hematological: Does not bruise/bleed easily.   Psychiatric/Behavioral: Negative for confusion and sleep disturbance. The patient is not nervous/anxious.        Objective:   Physical Exam:   Constitutional: She appears well-developed and well-nourished. No distress.    HENT:   Head: Normocephalic and atraumatic.    Eyes: No scleral icterus.    Neck: Normal range of motion. Neck supple.    Cardiovascular: Normal rate and intact distal pulses.  Exam reveals no cyanosis and no edema.     Pulmonary/Chest: Effort normal. No respiratory distress. She exhibits no tenderness.        Abdominal: Soft. She exhibits no distension (incisions healed), no fluid wave, no ascites and no mass. There is no tenderness. There is no rebound and no guarding. No hernia.     Genitourinary:  Rectum normal and vagina normal. Pelvic exam was performed with patient supine. There is no rash, tenderness or lesion on the right labia. There is no rash, tenderness or lesion on the left labia. Uterus is absent. There is an absent adnexa. Right adnexum displays no mass, no tenderness and no fullness. Left adnexum displays no mass, no tenderness and no fullness. No bleeding (cuff without lesion) or unspecified prolapse of vaginal walls in the vagina. No vaginal discharge found. Vaginal cuff normal.Labial bartholins normal.Cervix exhibits absence.              Lymphadenopathy:     She has no cervical adenopathy.        Right: No inguinal adenopathy present.        Left: No inguinal adenopathy present.     Skin: No cyanosis.        Assessment:     1. Endometrial cancer        Plan:       RANDI on exam today.  RTC 3 months for obs given risk of recurrence.

## 2019-06-05 ENCOUNTER — TELEPHONE (OUTPATIENT)
Dept: HEMATOLOGY/ONCOLOGY | Facility: CLINIC | Age: 59
End: 2019-06-05

## 2019-06-05 NOTE — TELEPHONE ENCOUNTER
SW contacted pt for introduction to supportive care and addressed her distress score of 5/10 last month. Pt reported no immediate needs, and SW encouraged her to call back if needs arise. Pt was agreeable to meeting in person when she RTC in August. SW will f/u at that time to address any concerns.

## 2019-09-18 ENCOUNTER — OFFICE VISIT (OUTPATIENT)
Dept: GYNECOLOGIC ONCOLOGY | Facility: CLINIC | Age: 59
End: 2019-09-18
Payer: MEDICAID

## 2019-09-18 VITALS
WEIGHT: 251.13 LBS | BODY MASS INDEX: 41.84 KG/M2 | SYSTOLIC BLOOD PRESSURE: 128 MMHG | DIASTOLIC BLOOD PRESSURE: 82 MMHG | HEIGHT: 65 IN | HEART RATE: 81 BPM

## 2019-09-18 DIAGNOSIS — C54.1 ENDOMETRIAL CANCER: Primary | ICD-10-CM

## 2019-09-18 PROCEDURE — 99999 PR PBB SHADOW E&M-EST. PATIENT-LVL III: CPT | Mod: PBBFAC,,, | Performed by: OBSTETRICS & GYNECOLOGY

## 2019-09-18 PROCEDURE — 99213 OFFICE O/P EST LOW 20 MIN: CPT | Mod: PBBFAC | Performed by: OBSTETRICS & GYNECOLOGY

## 2019-09-18 PROCEDURE — 99214 OFFICE O/P EST MOD 30 MIN: CPT | Mod: S$PBB,,, | Performed by: OBSTETRICS & GYNECOLOGY

## 2019-09-18 PROCEDURE — 99214 PR OFFICE/OUTPT VISIT, EST, LEVL IV, 30-39 MIN: ICD-10-PCS | Mod: S$PBB,,, | Performed by: OBSTETRICS & GYNECOLOGY

## 2019-09-18 PROCEDURE — 99999 PR PBB SHADOW E&M-EST. PATIENT-LVL III: ICD-10-PCS | Mod: PBBFAC,,, | Performed by: OBSTETRICS & GYNECOLOGY

## 2019-09-18 NOTE — PROGRESS NOTES
Subjective:      Patient ID: Emerald Bee is a 59 y.o. female.    Chief Complaint: Endometrial Cancer    Treatment History  Stage IAG2 endometrial cancer  RALH/BSO/Pelvic LAD on 12/27/18    HPI  Here today for continued f/u. Denies VB, F/C, N/V.  Reports normal bladder and bowel function.  Has no new complaints and reports feeling well.  Review of Systems   Constitutional: Negative for activity change, appetite change, chills, fatigue and fever.   HENT: Negative for hearing loss, mouth sores, nosebleeds, sore throat and tinnitus.    Eyes: Negative for visual disturbance.   Respiratory: Negative for cough, chest tightness, shortness of breath and wheezing.    Cardiovascular: Negative for chest pain and leg swelling.   Gastrointestinal: Negative for abdominal distention, abdominal pain, blood in stool, constipation, diarrhea, nausea and vomiting.   Genitourinary: Negative for dysuria, flank pain, frequency, hematuria, pelvic pain, vaginal bleeding, vaginal discharge and vaginal pain.   Musculoskeletal: Negative for arthralgias and back pain.   Skin: Negative for rash.   Neurological: Negative for dizziness, seizures, syncope, weakness and numbness.   Hematological: Does not bruise/bleed easily.   Psychiatric/Behavioral: Negative for confusion and sleep disturbance. The patient is not nervous/anxious.        Objective:   Physical Exam:   Constitutional: She appears well-developed and well-nourished. No distress.    HENT:   Head: Normocephalic and atraumatic.    Eyes: No scleral icterus.    Neck: Normal range of motion. Neck supple.    Cardiovascular: Normal rate and intact distal pulses.  Exam reveals no cyanosis and no edema.     Pulmonary/Chest: Effort normal. No respiratory distress. She exhibits no tenderness.        Abdominal: Soft. She exhibits no distension (incisions healed), no fluid wave, no ascites and no mass. There is no tenderness. There is no rebound and no guarding. No hernia.     Genitourinary:  Rectum normal and vagina normal. Pelvic exam was performed with patient supine. There is no rash, tenderness or lesion on the right labia. There is no rash, tenderness or lesion on the left labia. Uterus is absent. There is an absent adnexa. Right adnexum displays no mass, no tenderness and no fullness. Left adnexum displays no mass, no tenderness and no fullness. No bleeding (cuff without lesion) or unspecified prolapse of vaginal walls in the vagina. No vaginal discharge found. Vaginal cuff normal.Labial bartholins normal.Cervix exhibits absence.              Lymphadenopathy:     She has no cervical adenopathy.     Skin: No cyanosis.        Assessment:     1. Endometrial cancer        Plan:       RANDI on exam today.  RTC 3 months for obs given risk of recurrence.

## 2019-10-04 ENCOUNTER — TELEPHONE (OUTPATIENT)
Dept: HEMATOLOGY/ONCOLOGY | Facility: CLINIC | Age: 59
End: 2019-10-04

## 2019-10-04 NOTE — TELEPHONE ENCOUNTER
----- Message from Nina Og RN sent at 10/3/2019  3:47 PM CDT -----  Contact: Pt      ----- Message -----  From: Chau Alfaro  Sent: 10/3/2019   3:42 PM CDT  To: [facility] Staff    Pt called in regards to speaking with the staff in regards to possibly getting a summary for proof of office visit e-mail to her. Pt can be reached at [phone] ([phone]) .

## 2019-11-05 ENCOUNTER — TELEPHONE (OUTPATIENT)
Dept: ADMINISTRATIVE | Facility: OTHER | Age: 59
End: 2019-11-05

## 2019-12-17 ENCOUNTER — TELEPHONE (OUTPATIENT)
Dept: ADMINISTRATIVE | Facility: OTHER | Age: 59
End: 2019-12-17

## 2019-12-18 ENCOUNTER — OFFICE VISIT (OUTPATIENT)
Dept: GYNECOLOGIC ONCOLOGY | Facility: CLINIC | Age: 59
End: 2019-12-18
Payer: MEDICAID

## 2019-12-18 VITALS
HEART RATE: 82 BPM | BODY MASS INDEX: 42.08 KG/M2 | DIASTOLIC BLOOD PRESSURE: 70 MMHG | WEIGHT: 249 LBS | SYSTOLIC BLOOD PRESSURE: 137 MMHG

## 2019-12-18 DIAGNOSIS — C54.1 ENDOMETRIAL CANCER: Primary | ICD-10-CM

## 2019-12-18 PROCEDURE — 99999 PR PBB SHADOW E&M-EST. PATIENT-LVL III: CPT | Mod: PBBFAC,,, | Performed by: OBSTETRICS & GYNECOLOGY

## 2019-12-18 PROCEDURE — 99213 OFFICE O/P EST LOW 20 MIN: CPT | Mod: PBBFAC | Performed by: OBSTETRICS & GYNECOLOGY

## 2019-12-18 PROCEDURE — 99214 OFFICE O/P EST MOD 30 MIN: CPT | Mod: S$PBB,,, | Performed by: OBSTETRICS & GYNECOLOGY

## 2019-12-18 PROCEDURE — 99214 PR OFFICE/OUTPT VISIT, EST, LEVL IV, 30-39 MIN: ICD-10-PCS | Mod: S$PBB,,, | Performed by: OBSTETRICS & GYNECOLOGY

## 2019-12-18 PROCEDURE — 99999 PR PBB SHADOW E&M-EST. PATIENT-LVL III: ICD-10-PCS | Mod: PBBFAC,,, | Performed by: OBSTETRICS & GYNECOLOGY

## 2019-12-18 RX ORDER — GLIPIZIDE 10 MG/1
5 TABLET, FILM COATED, EXTENDED RELEASE ORAL
COMMUNITY

## 2019-12-18 NOTE — PROGRESS NOTES
Subjective:      Patient ID: Emerald Bee is a 59 y.o. female.    Chief Complaint: Endometrial Cancer (3mth f/u)    Treatment History  Stage IAG2 endometrial cancer  RALH/BSO/Pelvic LAD on 12/27/18    HPI  Here today for continued f/u. Denies VB, F/C, N/V.  Reports normal bladder and bowel function.  Has no new complaints and reports feeling well.  Review of Systems   Constitutional: Negative for activity change, appetite change, chills, fatigue and fever.   HENT: Negative for hearing loss, mouth sores, nosebleeds, sore throat and tinnitus.    Eyes: Negative for visual disturbance.   Respiratory: Negative for cough, chest tightness, shortness of breath and wheezing.    Cardiovascular: Negative for chest pain and leg swelling.   Gastrointestinal: Negative for abdominal distention, abdominal pain, blood in stool, constipation, diarrhea, nausea and vomiting.   Genitourinary: Negative for dysuria, flank pain, frequency, hematuria, pelvic pain, vaginal bleeding, vaginal discharge and vaginal pain.   Musculoskeletal: Negative for arthralgias and back pain.   Skin: Negative for rash.   Neurological: Negative for dizziness, seizures, syncope, weakness and numbness.   Hematological: Does not bruise/bleed easily.   Psychiatric/Behavioral: Negative for confusion and sleep disturbance. The patient is not nervous/anxious.        Objective:   Physical Exam:   Constitutional: She appears well-developed and well-nourished. No distress.    HENT:   Head: Normocephalic and atraumatic.    Eyes: No scleral icterus.    Neck: Normal range of motion. Neck supple.    Cardiovascular: Normal rate and intact distal pulses.  Exam reveals no cyanosis and no edema.     Pulmonary/Chest: Effort normal. No respiratory distress. She exhibits no tenderness.        Abdominal: Soft. She exhibits no distension (incisions healed), no fluid wave, no ascites and no mass. There is no tenderness. There is no rebound and no guarding. No hernia.      Genitourinary: Rectum normal and vagina normal. Pelvic exam was performed with patient supine. There is no rash, tenderness or lesion on the right labia. There is no rash, tenderness or lesion on the left labia. Uterus is absent. There is an absent adnexa. Right adnexum displays no mass, no tenderness and no fullness. Left adnexum displays no mass, no tenderness and no fullness. No bleeding (cuff without lesion) or unspecified prolapse of vaginal walls in the vagina. No vaginal discharge found. Vaginal cuff normal.Labial bartholins normal.Cervix exhibits absence.              Lymphadenopathy:     She has no cervical adenopathy.     Skin: No cyanosis.        Assessment:     1. Endometrial cancer        Plan:       RANDI on exam today.  RTC 3 months for obs given risk of recurrence.

## 2021-05-12 ENCOUNTER — PATIENT MESSAGE (OUTPATIENT)
Dept: RESEARCH | Facility: HOSPITAL | Age: 61
End: 2021-05-12

## 2021-12-07 ENCOUNTER — HISTORICAL (OUTPATIENT)
Dept: ADMINISTRATIVE | Facility: HOSPITAL | Age: 61
End: 2021-12-07

## 2022-04-10 ENCOUNTER — HISTORICAL (OUTPATIENT)
Dept: ADMINISTRATIVE | Facility: HOSPITAL | Age: 62
End: 2022-04-10
Payer: MEDICAID

## 2022-04-27 VITALS
WEIGHT: 250 LBS | SYSTOLIC BLOOD PRESSURE: 129 MMHG | DIASTOLIC BLOOD PRESSURE: 86 MMHG | OXYGEN SATURATION: 97 % | BODY MASS INDEX: 42.68 KG/M2 | HEIGHT: 64 IN

## 2023-08-22 NOTE — HISTORICAL [facility] CERNER
This is a historical note converted from [employer]. Formatting and pictures may have been removed.  Please reference [facility] for original formatting and attached multimedia. Chief Complaint  left shoulder pain  History of Present Illness  61 Years??RHD?Female??presents to?[facility]?for?initial visit?for?left shoulder pain. ?Referral note reviewed.  ?  ?  Onset:?worsening since Oct 2021. She has insidious pain. No hx of trauma. She has pain from left neck to elbow from time to time. She has most pain in the posterior shoulder. Her pain affects her ADLs. She has most pain with reaching up above head or reaching out.  No prior injuries.  Previous treatment:?none  Current pain level: 6/10 (rated as?worsening)??  Modifying factors:?worse with activity;?  Associated Symptoms:?no numbness or tingling;?no swelling;?no skin changes;?no weakness;?no decrease in ROM  Activity:?sedentary, not full ADLs, pain interfering with ADLs;?pain interferes with function/daily activity moderately  ?  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 She sees Neurology in   Review of Systems  10 review of systems negative except as stated in HPI  ?  Physical Exam  Vitals & Measurements  T:?36.8? ?C (Oral)? HR:?77(Peripheral)? RR:?18? BP:?129/86? SpO2:?97%?  HT:?162.20?cm? WT:?113.400?kg? BMI:?43.1?  General:?well developed; well nourished; cooperative  PSYCH: alert and oriented with?appropriate mood and affect  SKIN: inspection and palpation of skin and soft tissue normal;  CV: vascular integrity noted; +2 symmetrical pulses  Resp: Normal work of breathing, quiet breathing  ?  L?Shoulder  ?   Inspection?  Skin:?Normal?No signs of infection?  Atrophy:?deconditioning of the deltoid??  Effusion:?none?  Warmth:?none  Erythema:?none  ?  Palpation?  Tenderness:?no TTP over the bicipital groove, lateral joint, AC joint, spine of scapula, lower or upper  arm  Crepitation:?none?  ?  ROM??  (Active;Passive):  Forward Flexion (0-180):?90;?110  Extension (0-60):?60;?60  Abduction (0-140):?90;?110  Adduction (0-140):?140;?140  Internal Rotation (0-90):?to?iliac crest  External rotation (0-60):?20;40  ?   Strength:?4/5 in flexion, abduction and ER  ?  Special Testing:  Empty can Test:+  Lift-off Test:negative  Drop Arm Test:negative  Neers Test:+  Sosa Test:+  Painful arc: +  Speeds Test:negative  Yergasons Test:negative  OBriens Test:negative  Cross Body Adduction Test:negative  Crank Test:not assessed  Apprehension Test:not assessed  Relocation Test:not assessed  ?  ?   Neurovascular?  BP:?2+?  RP:?2+?  ?  Reflexes?  Biceps:?2+?  Triceps:?2+??  Sensation:?normal  ?   Spurlings: Negative  ?   As per my interpretation of this patients?leftshoulder plain?film,?GH and AC joint spaces are grossly preserved, sclerosis of the GH joint?noted,?rotator cuff interval is?preserved, no fractures or dislocations noted  Assessment/Plan  1.?Left shoulder pain?M25.512  ?61 Years?old patient?new to sports medicine clinic?for evaluation of left shoulder pain?likely secondary to RTC syndrome?complicated by underlying adhesive capsulitis.  ?  ?  Moderate?chronic?exacerbation  Radiological studies ordered and?interpreted by me, my independent interpretation attached, reviewed my findings with patient and showed them their images  No injections today. ?Patient will continue with conservative management to start  Activity as tolerated, behavior modification encouraged  Formal PT x 12wks, 3 times weekly, HEP daily, Ice/Heat prn,?NSAIDs/Tylenol/topical anasthetics PRN,?meloxicam and Medrol Dosepak prescription sent to pharmacy today med precautions given,??  Follow up?4 months, repeat assessment  ?   Patient encouraged to follow-up with her neurologist for her radicular type of pain.  Ordered:  Clinic Follow up, *Est. 04/07/22 3:00:00 CDT, Order for future visit, Left shoulder pain  Rotator  cuff syndrome of left shoulder  Adhesive capsulitis of left shoulder, [facility]  Office/Outpatient Visit Level 4 New [id number] PC, Left shoulder pain  Rotator cuff syndrome of left shoulder  Adhesive capsulitis of left shoulder, [facility], 12/07/21 11:37:00 CST  XR Shoulder Left Minimum 2 Views, Routine, 12/07/21 10:20:00 CST, Pain, None, Ambulatory, Rad Type, Left shoulder pain, Not Scheduled, 12/07/21 10:20:00 CST  ?  2.?Rotator cuff syndrome of left shoulder?M75.102  Ordered:  Clinic Follow up, *Est. 04/07/22 3:00:00 CDT, Order for future visit, Left shoulder pain  Rotator cuff syndrome of left shoulder  Adhesive capsulitis of left shoulder, [facility]  Office/Outpatient Visit Level 4 New [id number] PC, Left shoulder pain  Rotator cuff syndrome of left shoulder  Adhesive capsulitis of left shoulder, [facility], 12/07/21 11:37:00 CST  ?  3.?Adhesive capsulitis of left shoulder?M75.02  Ordered:  Clinic Follow up, *Est. 04/07/22 3:00:00 CDT, Order for future visit, Left shoulder pain  Rotator cuff syndrome of left shoulder  Adhesive capsulitis of left shoulder, [facility]  Office/Outpatient Visit Level 4 New [id number] PC, Left shoulder pain  Rotator cuff syndrome of left shoulder  Adhesive capsulitis of left shoulder, [facility], 12/07/21 11:37:00 CST  ?  Orders:  meloxicam, 15 mg = 1 tab(s), Oral, Daily, # 30 tab(s), 0 Refill(s), Pharmacy: [facility], 162.2, cm, Height/Length Dosing, 12/07/21 10:40:00 CST, 113.4, kg, Weight Dosing, 12/07/21 10:40:00 CST  methylPREDNISolone, = 1 packet(s), Oral, As Directed, as directed on package labeling, X 6 day(s), # 21 tab(s), 0 Refill(s), Pharmacy: [facility], 162.2, cm, Height/Length Dosing, 12/07/21 10:40:00 CST, 113.4, kg, Weight Dosing, 12/07/21 10:40:00 CST   Medications  acetaminophen-hydrocodone 325 mg-5 mg oral tablet, 1 tab(s), Oral, TID  acetaminophen-hydrocodone 325 mg-7.5 mg oral tablet, 1  tab(s), Oral, BID, PRN,? ?Not Taking, Completed Rx  aspirin 325 mg oral tablet, 325 mg= 1 tab(s), Oral, Daily,? ?Not taking  cyclobenzaprine 10 mg oral tablet, 10 mg= 1 tab(s), Oral, TID  empagliflozin, 0.5 tab(s), Oral, Daily  furosemide 20 mg oral tablet, 20 mg= 1 tab(s), Oral, qAM  gabapentin 300 mg oral capsule, 300 mg= 1 cap(s), Oral, TID  glipiZIDE 10 mg oral tablet, 10 mg= 1 tab(s), Oral, BID  Medrol Dosepak 4 mg oral tablet, 1 packet(s), Oral, As Directed  meloxicam 15 mg oral tablet, 15 mg= 1 tab(s), Oral, Daily  metFORMIN 1000 mg oral tablet, 1000 mg= 1 tab(s), Oral, BID  multivitamin with minerals (Adult Tab), 1 tab(s), Oral, Daily  Plavix 75 mg oral tablet, 75 mg= 1 tab(s), Oral, Daily  rosuvastatin 20 mg oral tablet, 20 mg= 1 tab(s), Oral, qAM  Vasculera 630 mg oral tablet, 630 mg= 1 tab(s), Oral, Daily,? ?Not Taking per Prescriber      High Dose Vitamin A Pregnancy And Lactation Text: High dose vitamin A therapy is contraindicated during pregnancy and breast feeding.

## 2024-06-25 NOTE — PLAN OF CARE
Patient prefers to have    [facility]  present for discharge.   Refill Decision Note   Sameera Olivarez  is requesting a refill authorization.  Brief Assessment and Rationale for Refill:  Approve     Medication Therapy Plan:       Medication Reconciliation Completed: No   Comments:     No Care Gaps recommended.     Note composed:8:35 AM 06/25/2024

## (undated) DEVICE — SEE MEDLINE ITEM 157110

## (undated) DEVICE — SYR 10CC LUER LOCK

## (undated) DEVICE — GLOVE BIOGEL SKINSENSE PI 7.0

## (undated) DEVICE — KIT WING PAD POSITIONING

## (undated) DEVICE — DRAPE ARM DAVINCI XI

## (undated) DEVICE — DRAPE INCISE IOBAN 2 23X17IN

## (undated) DEVICE — DRAPE COLUMN DAVINCI XI

## (undated) DEVICE — STRIP STERI REIN CLSR 1/2X2IN

## (undated) DEVICE — DEVICE ANC SW STAT FOLEY 6-24

## (undated) DEVICE — GLOVE PROTEXIS NEOPRENE 7.5

## (undated) DEVICE — POWDER ARISTA AH 3G

## (undated) DEVICE — UNDERGLOVES BIOGEL PI SZ 7 LF

## (undated) DEVICE — SEE MEDLINE ITEM 152622

## (undated) DEVICE — SOL WATER STRL IRR 1000ML

## (undated) DEVICE — SET TRI-LUMEN FILTERED TUBE

## (undated) DEVICE — POSITIONER HEAD ADULT

## (undated) DEVICE — OBTURATOR BLADELESS 8MM XI

## (undated) DEVICE — SEE MEDLINE ITEM 156923

## (undated) DEVICE — SOL ELECTROLUBE ANTI-STIC

## (undated) DEVICE — SOL NS 1000CC

## (undated) DEVICE — SEAL UNIVERSAL 5MM-8MM XI

## (undated) DEVICE — SOL CLEARIFY VISUALIZATION LAP

## (undated) DEVICE — ELECTRODE REM PLYHSV RETURN 9

## (undated) DEVICE — SUT VICRYL 0 27 CT-2

## (undated) DEVICE — NDL INSUF ULTRA VERESS 120MM

## (undated) DEVICE — JELLY KY LUBRICATING 5G PACKET

## (undated) DEVICE — APPLICATOR ARISTA FLEX XL

## (undated) DEVICE — PORT ACCESS 8MM W/120MM LOW

## (undated) DEVICE — INSERT CUSHIONPRONE VIEW LARGE

## (undated) DEVICE — IRRIGATOR ENDOSCOPY DISP.

## (undated) DEVICE — COVER TIP CURVED SCISSORS XI

## (undated) DEVICE — MANIPULATOR VCARE PLUS 37MM LG

## (undated) DEVICE — SUT MCRYL PLUS 4-0 PS2 27IN